# Patient Record
Sex: FEMALE | Race: WHITE | NOT HISPANIC OR LATINO | Employment: STUDENT | URBAN - METROPOLITAN AREA
[De-identification: names, ages, dates, MRNs, and addresses within clinical notes are randomized per-mention and may not be internally consistent; named-entity substitution may affect disease eponyms.]

---
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
## 2020-02-13 ENCOUNTER — OFFICE VISIT (OUTPATIENT)
Dept: FAMILY MEDICINE CLINIC | Facility: CLINIC | Age: 20
End: 2020-02-13
Payer: COMMERCIAL

## 2020-02-13 VITALS
RESPIRATION RATE: 14 BRPM | HEIGHT: 66 IN | HEART RATE: 80 BPM | SYSTOLIC BLOOD PRESSURE: 110 MMHG | DIASTOLIC BLOOD PRESSURE: 80 MMHG | WEIGHT: 163 LBS | BODY MASS INDEX: 26.2 KG/M2

## 2020-02-13 DIAGNOSIS — Z00.00 PE (PHYSICAL EXAM), ANNUAL: ICD-10-CM

## 2020-02-13 DIAGNOSIS — Z23 NEED FOR VACCINATION: Primary | ICD-10-CM

## 2020-02-13 PROCEDURE — 99395 PREV VISIT EST AGE 18-39: CPT | Performed by: NURSE PRACTITIONER

## 2020-02-13 PROCEDURE — 90686 IIV4 VACC NO PRSV 0.5 ML IM: CPT

## 2020-02-13 PROCEDURE — 90471 IMMUNIZATION ADMIN: CPT

## 2020-02-13 NOTE — PROGRESS NOTES
Bhanu Barrett is a 23 y o   female and is here for routine health maintenance  Needs pe for work  The patient reports no problems  Cancer Screening  Colononoscopy N/A  Mammogram N/A  Pap N/A  Abnormal pap? no  Smoker never      Immunization History   Administered Date(s) Administered    DTP 01/24/2001, 03/28/2001    DTaP 5 08/23/2001, 05/14/2002, 06/20/2006    Hep B, adult 2000, 02/28/2001, 12/06/2001    Hib (PRP-OMP) 01/24/2001, 03/28/2001, 05/24/2001, 12/06/2001    IPV 02/28/2001, 05/24/2001, 05/14/2002, 08/24/2017    Influenza Quadrivalent, 6-35 Months IM 10/14/2014, 11/01/2017    Influenza, injectable, quadrivalent, preservative free 0 5 mL 02/13/2020    MMR 05/14/2002, 06/20/2006    Meningococcal B, Recombinant (Shantell Oakley) 08/24/2017    Meningococcal MCV4P 08/24/2017    Meningococcal, Unknown Serogroups 01/22/2013, 08/24/2017    Pneumococcal Conjugate PCV 7 02/28/2001, 03/28/2001    Tdap 01/22/2013    Tuberculin Skin Test-PPD Intradermal 04/04/2016, 05/17/2016, 11/01/2017    Varicella 12/06/2001, 03/27/2009        History:  LMP: No LMP recorded  Dentist Visit  within 6-12 months      The following portions of the patient's history were reviewed and updated as appropriate: allergies, current medications, past family history, past medical history, past social history, past surgical history and problem list       Review of Systems  Review of Systems   Constitutional: Negative  HENT: Negative  Eyes: Negative  Respiratory: Negative  Cardiovascular: Negative  Gastrointestinal: Negative  Endocrine: Negative  Genitourinary: Negative  Musculoskeletal: Negative  Skin: Negative  Allergic/Immunologic: Negative  Neurological: Negative  Hematological: Negative  Psychiatric/Behavioral: Negative            Objective   Vitals:    02/13/20 1506   BP: 110/80   Pulse: 80   Resp: 14     Wt Readings from Last 3 Encounters:   02/13/20 73 9 kg (163 lb) (89 %, Z= 1 25)*   10/24/19 72 6 kg (160 lb) (88 %, Z= 1 20)*   05/16/19 71 7 kg (158 lb) (88 %, Z= 1 19)*     * Growth percentiles are based on CDC (Girls, 2-20 Years) data  BP Readings from Last 3 Encounters:   02/13/20 110/80   10/24/19 110/68   05/16/19 122/82     Pulse Readings from Last 3 Encounters:   02/13/20 80   10/24/19 72   05/16/19 70     BMI Readings from Last 3 Encounters:   02/13/20 26 31 kg/m² (85 %, Z= 1 06)*   10/24/19 26 63 kg/m² (87 %, Z= 1 13)*   05/16/19 26 29 kg/m² (87 %, Z= 1 11)*     * Growth percentiles are based on [facility] (Girls, 2-20 Years) data  Physical Exam   Constitutional: She is oriented to person, place, and time  She appears well-developed and well-nourished  HENT:   Head: Normocephalic  Eyes: Pupils are equal, round, and reactive to light  Neck: Normal range of motion  Neck supple  Cardiovascular: Normal rate and regular rhythm  Pulmonary/Chest: Effort normal and breath sounds normal    Abdominal: Soft  Bowel sounds are normal    Musculoskeletal: Normal range of motion  Neurological: She is alert and oriented to person, place, and time  Skin: Skin is warm  Psychiatric: She has a normal mood and affect  Her behavior is normal  Judgment and thought content normal        Assessment/Plan     Healthy female exam     1  PE: conducted for pt  Forms completed  2  Patient Counseling:  --Nutrition: Stressed importance of moderation in sodium/caffeine intake, saturated fat and cholesterol, caloric balance, sufficient intake of fresh fruits, vegetables, fiber, calcium, iron  --Exercise: Stressed the importance of regular exercise  --Injury prevention: Discussed safety belts, safety helmets, smoke detector, smoking near bedding or upholstery  --Dental health: Discussed importance of regular tooth brushing, flossing, and dental visits  --Immunizations reviewed  --Discussed benefits of screening colonoscopy  --After hours service discussed with patient    3  Cancer Screening N/A  4  Labs not indicated   5  IMM all UTD  6  Follow up as needed for acute illness

## 2020-02-21 ENCOUNTER — APPOINTMENT (OUTPATIENT)
Dept: LAB | Facility: CLINIC | Age: 20
End: 2020-02-21

## 2020-02-21 ENCOUNTER — OCCMED (OUTPATIENT)
Dept: URGENT CARE | Facility: CLINIC | Age: 20
End: 2020-02-21

## 2020-02-21 DIAGNOSIS — Z02.1 PRE-EMPLOYMENT HEALTH SCREENING EXAMINATION: Primary | ICD-10-CM

## 2020-02-21 DIAGNOSIS — Z02.1 PRE-EMPLOYMENT HEALTH SCREENING EXAMINATION: ICD-10-CM

## 2020-02-21 PROCEDURE — 36415 COLL VENOUS BLD VENIPUNCTURE: CPT

## 2020-02-21 PROCEDURE — 86480 TB TEST CELL IMMUN MEASURE: CPT

## 2020-02-24 LAB
GAMMA INTERFERON BACKGROUND BLD IA-ACNC: 0.02 IU/ML
M TB IFN-G BLD-IMP: NEGATIVE
M TB IFN-G CD4+ BCKGRND COR BLD-ACNC: 0.03 IU/ML
M TB IFN-G CD4+ BCKGRND COR BLD-ACNC: 0.04 IU/ML
MITOGEN IGNF BCKGRD COR BLD-ACNC: >10 IU/ML

## 2020-03-07 ENCOUNTER — OFFICE VISIT (OUTPATIENT)
Dept: URGENT CARE | Facility: CLINIC | Age: 20
End: 2020-03-07
Payer: COMMERCIAL

## 2020-03-07 VITALS
BODY MASS INDEX: 26.52 KG/M2 | OXYGEN SATURATION: 99 % | SYSTOLIC BLOOD PRESSURE: 106 MMHG | DIASTOLIC BLOOD PRESSURE: 64 MMHG | HEART RATE: 75 BPM | WEIGHT: 165 LBS | HEIGHT: 66 IN | RESPIRATION RATE: 18 BRPM | TEMPERATURE: 97.3 F

## 2020-03-07 DIAGNOSIS — N39.0 URINARY TRACT INFECTION WITHOUT HEMATURIA, SITE UNSPECIFIED: Primary | ICD-10-CM

## 2020-03-07 DIAGNOSIS — R30.0 DYSURIA: ICD-10-CM

## 2020-03-07 LAB
SL AMB  POCT GLUCOSE, UA: ABNORMAL
SL AMB LEUKOCYTE ESTERASE,UA: ABNORMAL
SL AMB POCT BILIRUBIN,UA: ABNORMAL
SL AMB POCT BLOOD,UA: ABNORMAL
SL AMB POCT CLARITY,UA: ABNORMAL
SL AMB POCT COLOR,UA: ABNORMAL
SL AMB POCT KETONES,UA: ABNORMAL
SL AMB POCT NITRITE,UA: ABNORMAL
SL AMB POCT PH,UA: 6
SL AMB POCT SPECIFIC GRAVITY,UA: 1.03
SL AMB POCT URINE PROTEIN: 100
SL AMB POCT UROBILINOGEN: 0.2

## 2020-03-07 PROCEDURE — G0382 LEV 3 HOSP TYPE B ED VISIT: HCPCS | Performed by: NURSE PRACTITIONER

## 2020-03-07 PROCEDURE — 81002 URINALYSIS NONAUTO W/O SCOPE: CPT | Performed by: NURSE PRACTITIONER

## 2020-03-07 PROCEDURE — 87086 URINE CULTURE/COLONY COUNT: CPT | Performed by: NURSE PRACTITIONER

## 2020-03-07 RX ORDER — PENICILLIN V POTASSIUM 500 MG/1
TABLET ORAL
COMMUNITY
Start: 2019-12-17 | End: 2021-09-02

## 2020-03-07 RX ORDER — HYDROCODONE BITARTRATE AND ACETAMINOPHEN 5; 325 MG/1; MG/1
TABLET ORAL
COMMUNITY
Start: 2019-12-17 | End: 2021-09-02

## 2020-03-07 RX ORDER — PHENAZOPYRIDINE HYDROCHLORIDE 100 MG/1
100 TABLET, FILM COATED ORAL 3 TIMES DAILY PRN
Qty: 10 TABLET | Refills: 0 | Status: SHIPPED | OUTPATIENT
Start: 2020-03-07 | End: 2021-09-02

## 2020-03-07 RX ORDER — CLINDAMYCIN PHOSPHATE 10 UG/ML
LOTION TOPICAL
COMMUNITY
Start: 2020-01-22 | End: 2021-09-02

## 2020-03-07 RX ORDER — CIPROFLOXACIN 250 MG/1
250 TABLET, FILM COATED ORAL EVERY 12 HOURS SCHEDULED
Qty: 10 TABLET | Refills: 0 | Status: SHIPPED | OUTPATIENT
Start: 2020-03-07 | End: 2020-03-12

## 2020-03-07 NOTE — PROGRESS NOTES
Assessment/Plan    Urinary tract infection without hematuria, site unspecified [N39 0]  1  Urinary tract infection without hematuria, site unspecified  ciprofloxacin (CIPRO) 250 mg tablet   2  Dysuria  POCT urine dip    Urine culture    phenazopyridine (PYRIDIUM) 100 mg tablet         Subjective:     Patient ID: Ab Briceño is a 23 y o  female  Reason For Visit / Chief Complaint  Chief Complaint   Patient presents with    Possible UTI     pain,cloudy urine,freq  need to urinate         This is a 77-year-old female patient who presents to the urgent care today  Patient is complaining of burning with urination, frequency of urination and cloudy urine for approximately 24 hours  Patient denies fever or chills  Patient denies abdominal pain, back pain or suprapubic pain  Patient denies hematuria  Patient does express concerned because last year she had urinary tract infection and she ends up being septic in the hospital   Patient is not sexually active          Past Medical History:   Diagnosis Date    Asthma     Left buttock abscess 10/06/2017    Neoplasm of uncertain behavior 03/90/4705    of skin       Past Surgical History:   Procedure Laterality Date    ABSCESS DRAINAGE Left 10/06/2017    I & D OF LEFT BUTTOCK ABSCESS IN OFFICE-ANDREA HARRELL PA-C    SD KNEE SCOPE,MED/LAT MENISECTOMY Right 2/1/2019    Procedure: ARTHROSCOPY KNEE, RIGHT RESECTION OF ANTERIOMEDIAL PLICA;  Surgeon: Hodan Capps MD;  Location:  MAIN OR;  Service: Orthopedics       Family History   Problem Relation Age of Onset    Heart disease Father     Hyperlipidemia Father     BRUNO disease Father     Asthma Mother     Hyperlipidemia Mother     BRUNO disease Mother     Diabetes Maternal Grandmother     Heart disease Maternal Grandmother     Hyperlipidemia Maternal Grandmother     Heart disease Maternal Grandfather     Hyperlipidemia Maternal Grandfather     Heart disease Paternal Grandmother     Drug abuse Neg Hx  Mental illness Neg Hx        Review of Systems   Constitutional: Negative for chills and fever  Respiratory: Negative for stridor  Cardiovascular: Negative for chest pain  Gastrointestinal: Negative for abdominal pain, diarrhea, nausea and vomiting  Genitourinary: Positive for dysuria, frequency and urgency  Negative for difficulty urinating, flank pain, hematuria and pelvic pain  Musculoskeletal: Negative for back pain  Neurological: Negative for headaches  Objective:    /64   Pulse 75   Temp (!) 97 3 °F (36 3 °C)   Resp 18   Ht 5' 6" (1 676 m)   Wt 74 8 kg (165 lb)   SpO2 99%   BMI 26 63 kg/m²     Physical Exam   Constitutional: She is oriented to person, place, and time  Vital signs are normal  She appears well-developed and well-nourished  She does not appear ill  HENT:   Head: Normocephalic and atraumatic  Neck: Normal range of motion  Cardiovascular: Normal rate, regular rhythm, S1 normal, S2 normal and normal heart sounds  Exam reveals no gallop and no friction rub  No murmur heard  Pulmonary/Chest: Effort normal and breath sounds normal  No stridor  No respiratory distress  She has no decreased breath sounds  She has no wheezes  She has no rhonchi  She has no rales  She exhibits no tenderness  Abdominal: Soft  Bowel sounds are normal  She exhibits no distension  There is no tenderness  There is no rigidity, no rebound, no guarding and no CVA tenderness  Musculoskeletal: Normal range of motion  Neurological: She is alert and oriented to person, place, and time  Skin: Skin is warm and dry  Capillary refill takes less than 2 seconds  Psychiatric: She has a normal mood and affect  Nursing note and vitals reviewed

## 2020-03-07 NOTE — PATIENT INSTRUCTIONS
We saw you today for a likely urinary tract infection  We will send her urine for culture  The culture takes 48 hours  In the meantime, take the antibiotic as prescribed  When the culture returns, if I need to change the antibiotic I will call you personally  Rest   Drink plenty of fluids  You may take the Pyridium every 8 hours for the next 2 days as needed for burning and frequency  Please be advised that it will turn your urine bright orange  You should not wear contacts while taking this medication

## 2020-03-08 LAB — BACTERIA UR CULT: NORMAL

## 2020-04-23 DIAGNOSIS — L70.0 ACNE VULGARIS: ICD-10-CM

## 2020-04-23 RX ORDER — NORGESTIMATE AND ETHINYL ESTRADIOL
KIT
Qty: 84 TABLET | Refills: 0 | Status: SHIPPED | OUTPATIENT
Start: 2020-04-23 | End: 2020-06-23

## 2020-05-20 ENCOUNTER — OFFICE VISIT (OUTPATIENT)
Dept: OBGYN CLINIC | Facility: CLINIC | Age: 20
End: 2020-05-20
Payer: COMMERCIAL

## 2020-05-20 VITALS
BODY MASS INDEX: 26.03 KG/M2 | SYSTOLIC BLOOD PRESSURE: 112 MMHG | HEIGHT: 66 IN | TEMPERATURE: 98.2 F | WEIGHT: 162 LBS | DIASTOLIC BLOOD PRESSURE: 72 MMHG

## 2020-05-20 DIAGNOSIS — M76.31 ILIOTIBIAL BAND SYNDROME, RIGHT LEG: Primary | ICD-10-CM

## 2020-05-20 PROCEDURE — 3008F BODY MASS INDEX DOCD: CPT | Performed by: ORTHOPAEDIC SURGERY

## 2020-05-20 PROCEDURE — 1036F TOBACCO NON-USER: CPT | Performed by: ORTHOPAEDIC SURGERY

## 2020-05-20 PROCEDURE — 99213 OFFICE O/P EST LOW 20 MIN: CPT | Performed by: ORTHOPAEDIC SURGERY

## 2020-06-21 DIAGNOSIS — L70.0 ACNE VULGARIS: ICD-10-CM

## 2020-06-23 RX ORDER — NORGESTIMATE AND ETHINYL ESTRADIOL
KIT
Qty: 84 TABLET | Refills: 0 | Status: SHIPPED | OUTPATIENT
Start: 2020-06-23 | End: 2020-10-28 | Stop reason: SDUPTHER

## 2020-09-03 ENCOUNTER — TRANSCRIBE ORDERS (OUTPATIENT)
Dept: ADMINISTRATIVE | Facility: HOSPITAL | Age: 20
End: 2020-09-03

## 2020-09-03 ENCOUNTER — APPOINTMENT (OUTPATIENT)
Dept: LAB | Facility: HOSPITAL | Age: 20
End: 2020-09-03
Payer: COMMERCIAL

## 2020-09-03 DIAGNOSIS — L57.8 NODULAR ELASTOSIS WITH CYSTS AND COMEDONES OF FAVRE AND RACOUCHOT: ICD-10-CM

## 2020-09-03 DIAGNOSIS — L70.0 NODULAR ELASTOSIS WITH CYSTS AND COMEDONES OF FAVRE AND RACOUCHOT: ICD-10-CM

## 2020-09-03 DIAGNOSIS — Z79.899 ENCOUNTER FOR LONG-TERM (CURRENT) USE OF OTHER MEDICATIONS: ICD-10-CM

## 2020-09-03 DIAGNOSIS — Z79.899 ENCOUNTER FOR LONG-TERM (CURRENT) USE OF OTHER MEDICATIONS: Primary | ICD-10-CM

## 2020-09-03 LAB
ANION GAP SERPL CALCULATED.3IONS-SCNC: 10 MMOL/L (ref 4–13)
B-HCG SERPL-ACNC: <2 MIU/ML
BUN SERPL-MCNC: 11 MG/DL (ref 5–25)
CALCIUM SERPL-MCNC: 9.1 MG/DL (ref 8.3–10.1)
CHLORIDE SERPL-SCNC: 107 MMOL/L (ref 100–108)
CO2 SERPL-SCNC: 24 MMOL/L (ref 21–32)
CREAT SERPL-MCNC: 0.78 MG/DL (ref 0.6–1.3)
GFR SERPL CREATININE-BSD FRML MDRD: 111 ML/MIN/1.73SQ M
GLUCOSE SERPL-MCNC: 92 MG/DL (ref 65–140)
POTASSIUM SERPL-SCNC: 3.9 MMOL/L (ref 3.5–5.3)
SODIUM SERPL-SCNC: 141 MMOL/L (ref 136–145)

## 2020-09-03 PROCEDURE — 84702 CHORIONIC GONADOTROPIN TEST: CPT

## 2020-09-03 PROCEDURE — 36415 COLL VENOUS BLD VENIPUNCTURE: CPT

## 2020-09-03 PROCEDURE — 80048 BASIC METABOLIC PNL TOTAL CA: CPT

## 2020-10-26 ENCOUNTER — APPOINTMENT (EMERGENCY)
Dept: RADIOLOGY | Facility: HOSPITAL | Age: 20
End: 2020-10-26
Payer: COMMERCIAL

## 2020-10-26 ENCOUNTER — HOSPITAL ENCOUNTER (OUTPATIENT)
Facility: HOSPITAL | Age: 20
Setting detail: OBSERVATION
Discharge: HOME | End: 2020-10-27
Attending: SURGERY | Admitting: SURGERY
Payer: COMMERCIAL

## 2020-10-26 DIAGNOSIS — R18.8 FREE FLUID IN PELVIS: ICD-10-CM

## 2020-10-26 DIAGNOSIS — S01.81XA LACERATION OF FOREHEAD, INITIAL ENCOUNTER: Primary | ICD-10-CM
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
## 2020-10-26 LAB
BASE EXCESS BLDA CALC-SCNC: -3 MMOL/L (ref -2–3)
CA-I BLD-SCNC: 1.09 MMOL/L (ref 1.12–1.32)
GLUCOSE SERPL-MCNC: 98 MG/DL (ref 65–140)
HCO3 BLDA-SCNC: 20.3 MMOL/L (ref 24–30)
HCT VFR BLD AUTO: 37.4 % (ref 34.8–46.1)
HCT VFR BLD CALC: 34 % (ref 34.8–46.1)
HGB BLD-MCNC: 12.1 G/DL (ref 11.5–15.4)
HGB BLDA-MCNC: 11.6 G/DL (ref 11.5–15.4)
PCO2 BLD: 21 MMOL/L (ref 21–32)
PCO2 BLD: 29.8 MM HG (ref 42–50)
PH BLD: 7.44 [PH] (ref 7.3–7.4)
PO2 BLD: 27 MM HG (ref 35–45)
POTASSIUM BLD-SCNC: 3.4 MMOL/L (ref 3.5–5.3)
SAO2 % BLD FROM PO2: 55 % (ref 60–85)
SODIUM BLD-SCNC: 142 MMOL/L (ref 136–145)
SPECIMEN SOURCE: ABNORMAL

## 2020-10-26 PROCEDURE — 90471 IMMUNIZATION ADMIN: CPT

## 2020-10-26 PROCEDURE — 85014 HEMATOCRIT: CPT | Performed by: PHYSICIAN ASSISTANT

## 2020-10-26 PROCEDURE — 85014 HEMATOCRIT: CPT

## 2020-10-26 PROCEDURE — 12013 RPR F/E/E/N/L/M 2.6-5.0 CM: CPT | Performed by: SURGERY

## 2020-10-26 PROCEDURE — 82947 ASSAY GLUCOSE BLOOD QUANT: CPT

## 2020-10-26 PROCEDURE — 99219 PR INITIAL OBSERVATION CARE/DAY 50 MINUTES: CPT | Performed by: SURGERY

## 2020-10-26 PROCEDURE — 84132 ASSAY OF SERUM POTASSIUM: CPT

## 2020-10-26 PROCEDURE — 96374 THER/PROPH/DIAG INJ IV PUSH: CPT

## 2020-10-26 PROCEDURE — 70450 CT HEAD/BRAIN W/O DYE: CPT

## 2020-10-26 PROCEDURE — 90715 TDAP VACCINE 7 YRS/> IM: CPT | Performed by: SURGERY

## 2020-10-26 PROCEDURE — 74177 CT ABD & PELVIS W/CONTRAST: CPT

## 2020-10-26 PROCEDURE — 36415 COLL VENOUS BLD VENIPUNCTURE: CPT | Performed by: PHYSICIAN ASSISTANT

## 2020-10-26 PROCEDURE — 71260 CT THORAX DX C+: CPT

## 2020-10-26 PROCEDURE — 82803 BLOOD GASES ANY COMBINATION: CPT

## 2020-10-26 PROCEDURE — 99285 EMERGENCY DEPT VISIT HI MDM: CPT

## 2020-10-26 PROCEDURE — 96376 TX/PRO/DX INJ SAME DRUG ADON: CPT

## 2020-10-26 PROCEDURE — 85018 HEMOGLOBIN: CPT | Performed by: PHYSICIAN ASSISTANT

## 2020-10-26 PROCEDURE — 72125 CT NECK SPINE W/O DYE: CPT

## 2020-10-26 PROCEDURE — 82330 ASSAY OF CALCIUM: CPT

## 2020-10-26 PROCEDURE — 84295 ASSAY OF SERUM SODIUM: CPT

## 2020-10-26 PROCEDURE — 93308 TTE F-UP OR LMTD: CPT | Performed by: SURGERY

## 2020-10-26 PROCEDURE — 76705 ECHO EXAM OF ABDOMEN: CPT | Performed by: SURGERY

## 2020-10-26 PROCEDURE — NC001 PR NO CHARGE: Performed by: PHYSICIAN ASSISTANT

## 2020-10-26 PROCEDURE — 73560 X-RAY EXAM OF KNEE 1 OR 2: CPT

## 2020-10-26 RX ORDER — OXYCODONE HYDROCHLORIDE 5 MG/1
5 TABLET ORAL EVERY 4 HOURS PRN
Status: DISCONTINUED | OUTPATIENT
Start: 2020-10-26 | End: 2020-10-27 | Stop reason: HOSPADM

## 2020-10-26 RX ORDER — HYDROMORPHONE HCL/PF 1 MG/ML
0.2 SYRINGE (ML) INJECTION
Status: DISCONTINUED | OUTPATIENT
Start: 2020-10-26 | End: 2020-10-27 | Stop reason: HOSPADM

## 2020-10-26 RX ORDER — OXYCODONE HYDROCHLORIDE 10 MG/1
10 TABLET ORAL EVERY 4 HOURS PRN
Status: DISCONTINUED | OUTPATIENT
Start: 2020-10-26 | End: 2020-10-27 | Stop reason: HOSPADM

## 2020-10-26 RX ORDER — SODIUM CHLORIDE 9 MG/ML
125 INJECTION, SOLUTION INTRAVENOUS CONTINUOUS
Status: DISCONTINUED | OUTPATIENT
Start: 2020-10-26 | End: 2020-10-27

## 2020-10-26 RX ORDER — FENTANYL CITRATE 50 UG/ML
INJECTION, SOLUTION INTRAMUSCULAR; INTRAVENOUS CODE/TRAUMA/SEDATION MEDICATION
Status: COMPLETED | OUTPATIENT
Start: 2020-10-26 | End: 2020-10-26

## 2020-10-26 RX ORDER — FENTANYL CITRATE 50 UG/ML
25 INJECTION, SOLUTION INTRAMUSCULAR; INTRAVENOUS ONCE
Status: COMPLETED | OUTPATIENT
Start: 2020-10-26 | End: 2020-10-26

## 2020-10-26 RX ORDER — LIDOCAINE HYDROCHLORIDE 10 MG/ML
30 INJECTION, SOLUTION EPIDURAL; INFILTRATION; INTRACAUDAL; PERINEURAL ONCE
Status: COMPLETED | OUTPATIENT
Start: 2020-10-26 | End: 2020-10-26

## 2020-10-26 RX ORDER — ACETAMINOPHEN 325 MG/1
975 TABLET ORAL EVERY 8 HOURS SCHEDULED
Status: DISCONTINUED | OUTPATIENT
Start: 2020-10-26 | End: 2020-10-27 | Stop reason: HOSPADM

## 2020-10-26 RX ADMIN — FENTANYL CITRATE 25 MCG: 50 INJECTION, SOLUTION INTRAMUSCULAR; INTRAVENOUS at 16:36

## 2020-10-26 RX ADMIN — FENTANYL CITRATE 25 MCG: 50 INJECTION INTRAMUSCULAR; INTRAVENOUS at 17:08

## 2020-10-26 RX ADMIN — IOHEXOL 100 ML: 350 INJECTION, SOLUTION INTRAVENOUS at 16:51

## 2020-10-26 RX ADMIN — OXYCODONE HYDROCHLORIDE 5 MG: 5 TABLET ORAL at 18:51

## 2020-10-26 RX ADMIN — ACETAMINOPHEN 975 MG: 325 TABLET, FILM COATED ORAL at 22:13

## 2020-10-26 RX ADMIN — SODIUM CHLORIDE 125 ML/HR: 0.9 INJECTION, SOLUTION INTRAVENOUS at 18:52

## 2020-10-26 RX ADMIN — LIDOCAINE HYDROCHLORIDE 30 ML: 10 INJECTION, SOLUTION EPIDURAL; INFILTRATION; INTRACAUDAL; PERINEURAL at 17:00

## 2020-10-26 RX ADMIN — TETANUS TOXOID, REDUCED DIPHTHERIA TOXOID AND ACELLULAR PERTUSSIS VACCINE, ADSORBED 0.5 ML: 5; 2.5; 8; 8; 2.5 SUSPENSION INTRAMUSCULAR at 16:44

## 2020-10-27 VITALS
HEIGHT: 66 IN | HEART RATE: 75 BPM | RESPIRATION RATE: 18 BRPM | BODY MASS INDEX: 27.85 KG/M2 | WEIGHT: 173.28 LBS | SYSTOLIC BLOOD PRESSURE: 122 MMHG | TEMPERATURE: 97.2 F | OXYGEN SATURATION: 90 % | DIASTOLIC BLOOD PRESSURE: 65 MMHG

## 2020-10-27 PROBLEM — R18.8 FREE FLUID IN PELVIS: Status: ACTIVE | Noted: 2020-10-27

## 2020-10-27 LAB
ANION GAP SERPL CALCULATED.3IONS-SCNC: 3 MMOL/L (ref 4–13)
BASOPHILS # BLD AUTO: 0.04 THOUSANDS/ΜL (ref 0–0.1)
BASOPHILS NFR BLD AUTO: 0 % (ref 0–1)
BUN SERPL-MCNC: 7 MG/DL (ref 5–25)
CALCIUM SERPL-MCNC: 8.7 MG/DL (ref 8.3–10.1)
CHLORIDE SERPL-SCNC: 113 MMOL/L (ref 100–108)
CO2 SERPL-SCNC: 26 MMOL/L (ref 21–32)
CREAT SERPL-MCNC: 0.6 MG/DL (ref 0.6–1.3)
EOSINOPHIL # BLD AUTO: 0.05 THOUSAND/ΜL (ref 0–0.61)
EOSINOPHIL NFR BLD AUTO: 0 % (ref 0–6)
ERYTHROCYTE [DISTWIDTH] IN BLOOD BY AUTOMATED COUNT: 12.1 % (ref 11.6–15.1)
GFR SERPL CREATININE-BSD FRML MDRD: 133 ML/MIN/1.73SQ M
GLUCOSE SERPL-MCNC: 81 MG/DL (ref 65–140)
HCT VFR BLD AUTO: 35 % (ref 34.8–46.1)
HCT VFR BLD AUTO: 39 % (ref 34.8–46.1)
HGB BLD-MCNC: 11 G/DL (ref 11.5–15.4)
HGB BLD-MCNC: 12.3 G/DL (ref 11.5–15.4)
IMM GRANULOCYTES # BLD AUTO: 0.03 THOUSAND/UL (ref 0–0.2)
IMM GRANULOCYTES NFR BLD AUTO: 0 % (ref 0–2)
LYMPHOCYTES # BLD AUTO: 2.17 THOUSANDS/ΜL (ref 0.6–4.47)
LYMPHOCYTES NFR BLD AUTO: 17 % (ref 14–44)
MCH RBC QN AUTO: 30 PG (ref 26.8–34.3)
MCHC RBC AUTO-ENTMCNC: 31.4 G/DL (ref 31.4–37.4)
MCV RBC AUTO: 95 FL (ref 82–98)
MONOCYTES # BLD AUTO: 1.29 THOUSAND/ΜL (ref 0.17–1.22)
MONOCYTES NFR BLD AUTO: 10 % (ref 4–12)
NEUTROPHILS # BLD AUTO: 8.93 THOUSANDS/ΜL (ref 1.85–7.62)
NEUTS SEG NFR BLD AUTO: 73 % (ref 43–75)
NRBC BLD AUTO-RTO: 0 /100 WBCS
PLATELET # BLD AUTO: 245 THOUSANDS/UL (ref 149–390)
PMV BLD AUTO: 10.5 FL (ref 8.9–12.7)
POTASSIUM SERPL-SCNC: 3.6 MMOL/L (ref 3.5–5.3)
RBC # BLD AUTO: 3.67 MILLION/UL (ref 3.81–5.12)
SODIUM SERPL-SCNC: 142 MMOL/L (ref 136–145)
WBC # BLD AUTO: 12.51 THOUSAND/UL (ref 4.31–10.16)

## 2020-10-27 PROCEDURE — 85025 COMPLETE CBC W/AUTO DIFF WBC: CPT | Performed by: PHYSICIAN ASSISTANT

## 2020-10-27 PROCEDURE — 85014 HEMATOCRIT: CPT | Performed by: PHYSICIAN ASSISTANT

## 2020-10-27 PROCEDURE — 97163 PT EVAL HIGH COMPLEX 45 MIN: CPT

## 2020-10-27 PROCEDURE — 36415 COLL VENOUS BLD VENIPUNCTURE: CPT | Performed by: PHYSICIAN ASSISTANT

## 2020-10-27 PROCEDURE — 97166 OT EVAL MOD COMPLEX 45 MIN: CPT

## 2020-10-27 PROCEDURE — NC001 PR NO CHARGE: Performed by: SURGERY

## 2020-10-27 PROCEDURE — 80048 BASIC METABOLIC PNL TOTAL CA: CPT | Performed by: PHYSICIAN ASSISTANT

## 2020-10-27 PROCEDURE — 85018 HEMOGLOBIN: CPT | Performed by: PHYSICIAN ASSISTANT

## 2020-10-27 PROCEDURE — 99217 PR OBSERVATION CARE DISCHARGE MANAGEMENT: CPT | Performed by: SURGERY

## 2020-10-27 RX ORDER — OXYCODONE HYDROCHLORIDE 5 MG/1
TABLET ORAL
Qty: 20 TABLET | Refills: 0 | Status: SHIPPED | OUTPATIENT
Start: 2020-10-27 | End: 2021-09-02

## 2020-10-27 RX ORDER — LANOLIN ALCOHOL/MO/W.PET/CERES
6 CREAM (GRAM) TOPICAL
Status: DISCONTINUED | OUTPATIENT
Start: 2020-10-27 | End: 2020-10-27

## 2020-10-27 RX ORDER — ONDANSETRON 4 MG/1
4 TABLET, ORALLY DISINTEGRATING ORAL EVERY 6 HOURS PRN
Status: DISCONTINUED | OUTPATIENT
Start: 2020-10-27 | End: 2020-10-27 | Stop reason: HOSPADM

## 2020-10-27 RX ORDER — LANOLIN ALCOHOL/MO/W.PET/CERES
6 CREAM (GRAM) TOPICAL
Status: DISCONTINUED | OUTPATIENT
Start: 2020-10-27 | End: 2020-10-27 | Stop reason: HOSPADM

## 2020-10-27 RX ADMIN — ACETAMINOPHEN 975 MG: 325 TABLET, FILM COATED ORAL at 13:07

## 2020-10-27 RX ADMIN — MELATONIN 6 MG: at 03:00

## 2020-10-27 RX ADMIN — OXYCODONE HYDROCHLORIDE 10 MG: 10 TABLET ORAL at 06:03

## 2020-10-27 RX ADMIN — ACETAMINOPHEN 975 MG: 325 TABLET, FILM COATED ORAL at 05:24

## 2020-10-27 RX ADMIN — OXYCODONE HYDROCHLORIDE 5 MG: 5 TABLET ORAL at 11:40

## 2020-10-27 RX ADMIN — SODIUM CHLORIDE 125 ML/HR: 0.9 INJECTION, SOLUTION INTRAVENOUS at 05:28

## 2020-10-27 RX ADMIN — ONDANSETRON 4 MG: 4 TABLET, ORALLY DISINTEGRATING ORAL at 11:40

## 2020-10-27 RX ADMIN — ENOXAPARIN SODIUM 30 MG: 30 INJECTION SUBCUTANEOUS at 09:50

## 2020-10-28 ENCOUNTER — TRANSITIONAL CARE MANAGEMENT (OUTPATIENT)
Dept: FAMILY MEDICINE CLINIC | Facility: CLINIC | Age: 20
End: 2020-10-28

## 2020-10-28 DIAGNOSIS — L70.0 ACNE VULGARIS: ICD-10-CM

## 2020-10-28 RX ORDER — NORGESTIMATE AND ETHINYL ESTRADIOL 7DAYSX3 LO
1 KIT ORAL DAILY
Qty: 84 TABLET | Refills: 0 | Status: SHIPPED | OUTPATIENT
Start: 2020-10-28 | End: 2020-12-03

## 2020-10-30 ENCOUNTER — TELEPHONE (OUTPATIENT)
Dept: SURGERY | Facility: CLINIC | Age: 20
End: 2020-10-30
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
## 2020-10-30 RX ORDER — METHOCARBAMOL 500 MG/1
500 TABLET, FILM COATED ORAL
Qty: 15 TABLET | Refills: 0 | Status: SHIPPED | OUTPATIENT
Start: 2020-10-30 | End: 2021-09-02

## 2020-11-01 ENCOUNTER — OFFICE VISIT (OUTPATIENT)
Dept: URGENT CARE | Facility: CLINIC | Age: 20
End: 2020-11-01
Payer: COMMERCIAL

## 2020-11-01 VITALS
DIASTOLIC BLOOD PRESSURE: 69 MMHG | HEIGHT: 65 IN | HEART RATE: 75 BPM | SYSTOLIC BLOOD PRESSURE: 131 MMHG | TEMPERATURE: 96.9 F | WEIGHT: 164 LBS | RESPIRATION RATE: 16 BRPM | BODY MASS INDEX: 27.32 KG/M2 | OXYGEN SATURATION: 100 %

## 2020-11-01 DIAGNOSIS — Z48.02 ENCOUNTER FOR REMOVAL OF SUTURES: Primary | ICD-10-CM

## 2020-11-01 PROCEDURE — G0382 LEV 3 HOSP TYPE B ED VISIT: HCPCS | Performed by: PHYSICIAN ASSISTANT

## 2020-11-01 RX ORDER — SPIRONOLACTONE 25 MG/1
25 TABLET ORAL DAILY
COMMUNITY
End: 2021-09-02

## 2020-11-10 ENCOUNTER — OFFICE VISIT (OUTPATIENT)
Dept: SURGERY | Facility: CLINIC | Age: 20
End: 2020-11-10
Payer: COMMERCIAL

## 2020-11-10 VITALS — TEMPERATURE: 98 F | BODY MASS INDEX: 26.79 KG/M2 | HEIGHT: 65 IN | WEIGHT: 160.8 LBS

## 2020-11-10 DIAGNOSIS — R18.8 FREE FLUID IN PELVIS: Primary | ICD-10-CM

## 2020-11-10 PROCEDURE — 99213 OFFICE O/P EST LOW 20 MIN: CPT | Performed by: EMERGENCY MEDICINE

## 2020-11-30 ENCOUNTER — TRANSCRIBE ORDERS (OUTPATIENT)
Dept: ADMINISTRATIVE | Facility: HOSPITAL | Age: 20
End: 2020-11-30

## 2020-11-30 ENCOUNTER — APPOINTMENT (OUTPATIENT)
Dept: LAB | Facility: HOSPITAL | Age: 20
End: 2020-11-30
Payer: COMMERCIAL

## 2020-11-30 DIAGNOSIS — Z79.899 ENCOUNTER FOR LONG-TERM (CURRENT) USE OF OTHER MEDICATIONS: Primary | ICD-10-CM

## 2020-11-30 DIAGNOSIS — Z79.899 ENCOUNTER FOR LONG-TERM (CURRENT) USE OF OTHER MEDICATIONS: ICD-10-CM

## 2020-11-30 LAB — POTASSIUM SERPL-SCNC: 3.4 MMOL/L (ref 3.5–5.3)

## 2020-11-30 PROCEDURE — 84132 ASSAY OF SERUM POTASSIUM: CPT

## 2020-11-30 PROCEDURE — 36415 COLL VENOUS BLD VENIPUNCTURE: CPT

## 2020-12-02 DIAGNOSIS — L70.0 ACNE VULGARIS: ICD-10-CM

## 2020-12-03 RX ORDER — NORGESTIMATE AND ETHINYL ESTRADIOL
KIT
Qty: 84 TABLET | Refills: 0 | Status: SHIPPED | OUTPATIENT
Start: 2020-12-03 | End: 2021-03-08

## 2020-12-24 ENCOUNTER — TELEPHONE (OUTPATIENT)
Dept: FAMILY MEDICINE CLINIC | Facility: CLINIC | Age: 20
End: 2020-12-24

## 2020-12-24 DIAGNOSIS — Z91.89 AT INCREASED RISK OF EXPOSURE TO COVID-19 VIRUS: Primary | ICD-10-CM

## 2020-12-31 ENCOUNTER — LAB (OUTPATIENT)
Dept: LAB | Facility: HOSPITAL | Age: 20
End: 2020-12-31
Payer: COMMERCIAL

## 2020-12-31 DIAGNOSIS — Z91.89 AT INCREASED RISK OF EXPOSURE TO COVID-19 VIRUS: ICD-10-CM

## 2020-12-31 PROCEDURE — 86769 SARS-COV-2 COVID-19 ANTIBODY: CPT

## 2020-12-31 PROCEDURE — 36415 COLL VENOUS BLD VENIPUNCTURE: CPT

## 2021-01-01 LAB — SARS-COV-2 IGM SERPL QL IA: NEGATIVE

## 2021-01-01 NOTE — RESULT ENCOUNTER NOTE
Please call patient  No antibodies to COVID so she cannot donate plasma  She apparently she never got the disease

## 2021-01-04 ENCOUNTER — TELEPHONE (OUTPATIENT)
Dept: FAMILY MEDICINE CLINIC | Facility: CLINIC | Age: 21
End: 2021-01-04

## 2021-01-04 NOTE — TELEPHONE ENCOUNTER
----- Message from Leo Elias DO sent at 1/1/2021  6:42 AM EST -----  Please call patient  No antibodies to COVID so she cannot donate plasma  She apparently she never got the disease      Left detailed message to patient

## 2021-03-07 DIAGNOSIS — L70.0 ACNE VULGARIS: ICD-10-CM

## 2021-03-08 RX ORDER — NORGESTIMATE AND ETHINYL ESTRADIOL
KIT
Qty: 84 TABLET | Refills: 0 | Status: SHIPPED | OUTPATIENT
Start: 2021-03-08 | End: 2021-06-03 | Stop reason: SDUPTHER

## 2021-06-03 DIAGNOSIS — L70.0 ACNE VULGARIS: ICD-10-CM

## 2021-06-03 RX ORDER — NORGESTIMATE AND ETHINYL ESTRADIOL 7DAYSX3 LO
1 KIT ORAL DAILY
Qty: 84 TABLET | Refills: 0 | Status: SHIPPED | OUTPATIENT
Start: 2021-06-03 | End: 2021-08-06 | Stop reason: SDUPTHER

## 2021-06-28 ENCOUNTER — OFFICE VISIT (OUTPATIENT)
Dept: URGENT CARE | Facility: CLINIC | Age: 21
End: 2021-06-28
Payer: COMMERCIAL

## 2021-06-28 VITALS
WEIGHT: 160 LBS | HEART RATE: 82 BPM | HEIGHT: 66 IN | SYSTOLIC BLOOD PRESSURE: 118 MMHG | TEMPERATURE: 98.2 F | BODY MASS INDEX: 25.71 KG/M2 | RESPIRATION RATE: 16 BRPM | OXYGEN SATURATION: 97 % | DIASTOLIC BLOOD PRESSURE: 70 MMHG

## 2021-06-28 DIAGNOSIS — J45.21 MILD INTERMITTENT ASTHMA WITH ACUTE EXACERBATION: Primary | ICD-10-CM

## 2021-06-28 PROCEDURE — G0382 LEV 3 HOSP TYPE B ED VISIT: HCPCS | Performed by: PHYSICIAN ASSISTANT

## 2021-06-28 RX ORDER — PREDNISONE 10 MG/1
TABLET ORAL
Qty: 21 TABLET | Refills: 0 | Status: SHIPPED | OUTPATIENT
Start: 2021-06-28 | End: 2021-09-02

## 2021-06-28 NOTE — PATIENT INSTRUCTIONS
Asthma   WHAT YOU NEED TO KNOW:   Asthma is a lung disease that makes breathing difficult  Chronic inflammation and reactions to triggers narrow the airways in the lungs  Asthma can become life-threatening if it is not managed  DISCHARGE INSTRUCTIONS:   Call your local emergency number (911 in the [address]) if:   · You have severe shortness of breath  · Your lips or nails turn blue or gray  · The skin around your neck and ribs pulls in with each breath  · You have shortness of breath, even after you take your short-term medicine as directed  · Your peak flow numbers are in the red zone of your AAP  Call your doctor if:   · You run out of medicine before your next refill is due  · Your symptoms get worse  · You need to take more medicine than usual to control your symptoms  · You have questions or concerns about your condition or care  Medicines:   · Medicines  decrease inflammation, open airways, and make it easier to breathe  Medicines may be inhaled, taken as a pill, or injected  Short-term medicines relieve your symptoms quickly  Long-term medicines are used to prevent future attacks  You may also need medicine to help control your allergies  Ask your healthcare provider for more information about the medicine you are given and how to take it safely  · Take your medicine as directed  Contact your healthcare provider if you think your medicine is not helping or if you have side effects  Tell him of her if you are allergic to any medicine  Keep a list of the medicines, vitamins, and herbs you take  Include the amounts, and when and why you take them  Bring the list or the pill bottles to follow-up visits  Carry your medicine list with you in case of an emergency  Follow up with your healthcare provider as directed: You will need to return to make sure your medicine is working and your symptoms are controlled   You may be referred to an asthma specialist  Marcey Canavan may be asked to keep a record of your peak flow values and bring it with you to your appointments  Write down your questions so you remember to ask them during your visits  Manage your symptoms and prevent future attacks:   · Follow your Asthma Action Plan (AAP)  This is a written plan that you and your healthcare provider create  It explains which medicine you need and when to change doses if necessary  It also explains how you can monitor symptoms and use a peak flow meter  The meter measures how well your lungs are working  · Manage other health conditions , such as allergies, acid reflux, and sleep apnea  · Identify and avoid triggers  These may include pets, dust mites, mold, and cockroaches  · Do not smoke or be around others who smoke  Nicotine and other chemicals in cigarettes and cigars can cause lung damage  Ask your healthcare provider for information if you currently smoke and need help to quit  E-cigarettes or smokeless tobacco still contain nicotine  Talk to your healthcare provider before you use these products  · Ask about the flu vaccine  The flu can make your asthma worse  You may need a yearly flu shot  © Copyright [address] Information is for End User's use only and may not be sold, redistributed or otherwise used for commercial purposes  All illustrations and images included in CareNotes® are the copyrighted property of A D A M , Inc  or [address] [place] [address] [place]   The above information is an  only  It is not intended as medical advice for individual conditions or treatments  Talk to your doctor, nurse or pharmacist before following any medical regimen to see if it is safe and effective for you

## 2021-08-05 ENCOUNTER — TELEPHONE (OUTPATIENT)
Dept: FAMILY MEDICINE CLINIC | Facility: CLINIC | Age: 21
End: 2021-08-05

## 2021-08-05 NOTE — TELEPHONE ENCOUNTER
She said she has an asthma cough only went to care now [facility]   [phone]

## 2021-08-05 NOTE — TELEPHONE ENCOUNTER
When she calls back just confirm that it is asthma symptoms, not a fever, productive cough, achy, chills, ect  Thanks

## 2021-08-05 NOTE — TELEPHONE ENCOUNTER
Patient put herself in the schedule on 8/23 for cough and asthma follow up  Dejah Bravo for her to come in?  I have a call out to her to see is she is vaccinated

## 2021-08-23 ENCOUNTER — TELEPHONE (OUTPATIENT)
Dept: FAMILY MEDICINE CLINIC | Facility: CLINIC | Age: 21
End: 2021-08-23

## 2021-09-02 ENCOUNTER — OFFICE VISIT (OUTPATIENT)
Dept: FAMILY MEDICINE CLINIC | Facility: CLINIC | Age: 21
End: 2021-09-02
Payer: COMMERCIAL

## 2021-09-02 VITALS
SYSTOLIC BLOOD PRESSURE: 122 MMHG | DIASTOLIC BLOOD PRESSURE: 62 MMHG | HEART RATE: 80 BPM | TEMPERATURE: 98.7 F | BODY MASS INDEX: 27.66 KG/M2 | WEIGHT: 166 LBS | RESPIRATION RATE: 16 BRPM | HEIGHT: 65 IN

## 2021-09-02 DIAGNOSIS — J45.990 EXERCISE-INDUCED ASTHMA: Primary | ICD-10-CM

## 2021-09-02 DIAGNOSIS — R06.02 SHORTNESS OF BREATH: ICD-10-CM

## 2021-09-02 PROBLEM — S01.81XA FOREHEAD LACERATION: Status: RESOLVED | Noted: 2020-10-26 | Resolved: 2021-09-02

## 2021-09-02 PROBLEM — R78.81 E COLI BACTEREMIA: Status: RESOLVED | Noted: 2019-05-11 | Resolved: 2021-09-02

## 2021-09-02 PROBLEM — B96.20 E COLI BACTEREMIA: Status: RESOLVED | Noted: 2019-05-11 | Resolved: 2021-09-02

## 2021-09-02 PROBLEM — M76.31 ILIOTIBIAL BAND SYNDROME, RIGHT LEG: Status: RESOLVED | Noted: 2019-10-24 | Resolved: 2021-09-02

## 2021-09-02 PROBLEM — R07.2 PRECORDIAL PAIN: Status: RESOLVED | Noted: 2019-05-11 | Resolved: 2021-09-02

## 2021-09-02 PROBLEM — Z47.89 AFTERCARE FOLLOWING SURGERY OF THE MUSCULOSKELETAL SYSTEM: Status: RESOLVED | Noted: 2019-02-14 | Resolved: 2021-09-02
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
## 2021-09-02 PROBLEM — R18.8 FREE FLUID IN PELVIS: Status: RESOLVED | Noted: 2020-10-27 | Resolved: 2021-09-02

## 2021-09-02 PROBLEM — N10 ACUTE PYELONEPHRITIS: Status: RESOLVED | Noted: 2019-05-09 | Resolved: 2021-09-02

## 2021-09-02 PROCEDURE — 99213 OFFICE O/P EST LOW 20 MIN: CPT | Performed by: PHYSICIAN ASSISTANT

## 2021-09-02 NOTE — PROGRESS NOTES
Assessment/Plan:    1  Exercise-induced asthma    - will do PFT and decide on treatment based on results  - Complete PFT with post bronchodilator; Future    2  Shortness of breath    - see above  - Complete PFT with post bronchodilator; Future    F/u as needed      Subjective:   Chief Complaint   Patient presents with    Follow-up     Asthma      Patient ID: Matilde Barney is a 21 y o  female  Patient was diagnosed with EIA as a teen  But never notes the trouble with exercise, mostly with waking and early morning  May started with cold and was having trouble breathing, continued through summer so in June went to Urgent Care and got prednisone treatment and helped, almost resolved 100% but then returned  Still in morning has some issues  Sometimes uses Albuterol but not always "that bad" to use it  Will help  Will spend night at boyfriends and he has cats and dogs, but noted even in [place] she had symptoms  Mom with history of asthma         The following portions of the patient's history were reviewed and updated as appropriate: allergies, current medications, past family history, past medical history, past social history, past surgical history and problem list     Past Medical History:   Diagnosis Date    Acne     Allergic     Asthma     Left buttock abscess 10/06/2017    Neoplasm of uncertain behavior 23/24/8425    of skin     Past Surgical History:   Procedure Laterality Date    ABSCESS DRAINAGE Left 10/06/2017    I & D OF LEFT BUTTOCK ABSCESS IN OFFICE-ANDREA HARRELL PA-C    WA KNEE SCOPE,MED/LAT MENISECTOMY Right 2/1/2019    Procedure: ARTHROSCOPY KNEE, RIGHT RESECTION OF ANTERIOMEDIAL PLICA;  Surgeon: Samantha Bonds MD;  Location: [facility] OR;  Service: Orthopedics     Family History   Problem Relation Age of Onset    Heart disease Father     Hyperlipidemia Father     BRUNO disease Father     Asthma Mother     Hyperlipidemia Mother     BRUON disease Mother     Diabetes Maternal Grandmother     Heart disease Maternal Grandmother     Hyperlipidemia Maternal Grandmother     Heart disease Maternal Grandfather     Hyperlipidemia Maternal Grandfather     Heart disease Paternal Grandmother     Drug abuse Neg Hx     Mental illness Neg Hx      Social History     Socioeconomic History    Marital status: Single     Spouse name: Not on file    Number of children: Not on file    Years of education: Not on file    Highest education level: Not on file   Occupational History    Not on file   Tobacco Use    Smoking status: Never Smoker    Smokeless tobacco: Never Used   Vaping Use    Vaping Use: Never used   Substance and Sexual Activity    Alcohol use: Never    Drug use: Never    Sexual activity: Not Currently   Other Topics Concern    Not on file   Social History Narrative    ** Merged History Encounter **         Always uses seatbelts  Daily cola consumption-1 can per day  Has smoke detectors       Social Determinants of Health     Financial Resource Strain:     Difficulty of Paying Living Expenses:    Food Insecurity:     Worried About Running Out of Food in the Last Year:     Ran Out of Food in the Last Year:    Transportation Needs:     Lack of Transportation (Medical):      Lack of Transportation (Non-Medical):    Physical Activity:     Days of Exercise per Week:     Minutes of Exercise per Session:    Stress:     Feeling of Stress :    Social Connections:     Frequency of Communication with Friends and Family:     Frequency of Social Gatherings with Friends and Family:     Attends Hindu Services:     Active Member of Clubs or Organizations:     Attends Club or Organization Meetings:     Marital Status:    Intimate Partner Violence:     Fear of Current or Ex-Partner:     Emotionally Abused:     Physically Abused:     Sexually Abused:        Current Outpatient Medications:     albuterol (PROVENTIL HFA,VENTOLIN HFA) 90 mcg/act inhaler, Inhale 2 puffs every 6 (six) hours as needed for wheezing, Disp: , Rfl:     norgestimate-ethinyl estradiol (Tri-Lo-Sprintec) 0 18/0 215/0 25 MG-25 MCG per tablet, Take 1 tablet by mouth daily, Disp: 84 tablet, Rfl: 0    clindamycin (CLEOCIN T) 1 % lotion, , Disp: , Rfl:     fluticasone (FLONASE) 50 mcg/act nasal spray, 2 sprays into each nostril daily as needed , Disp: , Rfl:     HYDROcodone-acetaminophen (NORCO) 5-325 mg per tablet, , Disp: , Rfl:     ibuprofen (MOTRIN) 600 mg tablet, Take 1 tablet (600 mg total) by mouth every 6 (six) hours as needed for mild pain, Disp: 30 tablet, Rfl: 0    methocarbamol (ROBAXIN) 500 mg tablet, Take 1 tablet (500 mg total) by mouth daily at bedtime as needed for muscle spasms, Disp: 15 tablet, Rfl: 0    montelukast (SINGULAIR) 10 mg tablet, Take 10 mg by mouth daily at bedtime, Disp: , Rfl:     oxyCODONE (ROXICODONE) 5 mg immediate release tablet, Take 1 tab for moderate pain  Take 2 tabs for severe pain   (Patient not taking: Reported on 11/10/2020), Disp: 20 tablet, Rfl: 0    pantoprazole (PROTONIX) 40 mg tablet, Take 1 tablet (40 mg total) by mouth daily in the early morning, Disp: 30 tablet, Rfl: 1    penicillin V potassium (VEETID) 500 mg tablet, , Disp: , Rfl:     phenazopyridine (PYRIDIUM) 100 mg tablet, Take 1 tablet (100 mg total) by mouth 3 (three) times a day as needed for bladder spasms (Patient not taking: Reported on 11/10/2020), Disp: 10 tablet, Rfl: 0    predniSONE 10 mg tablet, Take 6 tablets today, 5 tablets tomorrow, 4 the next day, 3 the next day, 2 the following and 1 the last day- all with food, Disp: 21 tablet, Rfl: 0    spironolactone (ALDACTONE) 25 mg tablet, Take 25 mg by mouth daily, Disp: , Rfl:     Review of Systems          Objective:    Vitals:    09/02/21 1029   BP: 122/62   Pulse: 80   Resp: 16   Temp: 98 7 °F (37 1 °C)   TempSrc: Oral   Weight: 75 3 kg (166 lb)   Height: 5' 5" (1 651 m)        Physical Exam  Constitutional:       Appearance: Normal appearance  She is well-developed and normal weight  Cardiovascular:      Rate and Rhythm: Normal rate and regular rhythm  Pulses: Normal pulses  Heart sounds: Normal heart sounds  Pulmonary:      Effort: Pulmonary effort is normal       Breath sounds: Normal breath sounds  Skin:     General: Skin is warm  Neurological:      General: No focal deficit present  Mental Status: She is alert and oriented to person, place, and time  Psychiatric:         Mood and Affect: Mood normal          Behavior: Behavior normal          Thought Content: Thought content normal          Judgment: Judgment normal        BMI Counseling: Body mass index is 27 62 kg/m²  The BMI is above normal  Nutrition recommendations include reducing portion sizes

## 2021-09-23 ENCOUNTER — OFFICE VISIT (OUTPATIENT)
Dept: URGENT CARE | Facility: CLINIC | Age: 21
End: 2021-09-23
Payer: COMMERCIAL

## 2021-09-23 VITALS — BODY MASS INDEX: 27.66 KG/M2 | WEIGHT: 166 LBS | HEIGHT: 65 IN

## 2021-09-23 DIAGNOSIS — J02.8 ACUTE PHARYNGITIS DUE TO OTHER SPECIFIED ORGANISMS: ICD-10-CM

## 2021-09-23 DIAGNOSIS — J06.9 ACUTE UPPER RESPIRATORY INFECTION: Primary | ICD-10-CM

## 2021-09-23 DIAGNOSIS — R68.89 FLU-LIKE SYMPTOMS: ICD-10-CM

## 2021-09-23 PROCEDURE — 87070 CULTURE OTHR SPECIMN AEROBIC: CPT | Performed by: NURSE PRACTITIONER

## 2021-09-23 PROCEDURE — U0005 INFEC AGEN DETEC AMPLI PROBE: HCPCS | Performed by: NURSE PRACTITIONER

## 2021-09-23 PROCEDURE — U0003 INFECTIOUS AGENT DETECTION BY NUCLEIC ACID (DNA OR RNA); SEVERE ACUTE RESPIRATORY SYNDROME CORONAVIRUS 2 (SARS-COV-2) (CORONAVIRUS DISEASE [COVID-19]), AMPLIFIED PROBE TECHNIQUE, MAKING USE OF HIGH THROUGHPUT TECHNOLOGIES AS DESCRIBED BY CMS-2020-01-R: HCPCS | Performed by: NURSE PRACTITIONER

## 2021-09-23 PROCEDURE — G0382 LEV 3 HOSP TYPE B ED VISIT: HCPCS | Performed by: NURSE PRACTITIONER

## 2021-09-23 NOTE — LETTER
September 23, 2021     Patient: Ivanna Clayton   YOB: 2000   Date of Visit: 9/23/2021       To Whom It May Concern: It is my medical opinion that Avel Del Angel no work until [employer] are back  If you have any questions or concerns, please don't hesitate to call           Sincerely,        EKATERINA Mace    CC: No Recipients

## 2021-09-23 NOTE — PROGRESS NOTES
[facility]        NAME: Geneva Kang is a 21 y o  female  : 2000    MRN: [id number]  DATE: 2021  TIME: 7:12 PM    Assessment and Plan   Acute upper respiratory infection [J06 9]  1  Acute upper respiratory infection  Throat culture   2  Flu-like symptoms  Novel Coronavirus (Covid-19[facility] - [facility]    Throat culture   3  Acute pharyngitis due to other specified organisms  Throat culture         Patient Instructions       Follow up with PCP in 3-5 days  Proceed to  ER if symptoms worsen  You have a covid and throat culture pending - you are to check  mychart for the results - you will be notified if they are +  You are to quarantine until the results are back  No work  You are to take dayquil, tylenol, motrin, vit d and c  Increase fluids  Follow up with your PCP  Go to the ED if symptoms worsen            Chief Complaint     Chief Complaint   Patient presents with    Sore Throat     for a day    Cough     2-3 days    Headache     for a week    Sinusitis     for a week    COVID-19     did not get covid shot         History of Present Illness       This is a 21year old female who has had 2-3 days of sorethroat, cough, headache x 1 week, sinus problems x 1 week  Works at [address] [place] [address] [employer]  No covid vaccination  Unknown covid exposure  Denies pregnancy     Sore Throat   Associated symptoms include congestion, coughing and headaches  Cough  Associated symptoms include headaches and a sore throat  Headache   Associated symptoms include coughing, sinus pressure and a sore throat  Sinusitis  Associated symptoms include congestion, coughing, headaches, sinus pressure and a sore throat  Review of Systems   Review of Systems   Constitutional: Negative  HENT: Positive for congestion, sinus pressure, sinus pain and sore throat  Eyes: Negative  Respiratory: Positive for cough  Cardiovascular: Negative  Gastrointestinal: Negative  Endocrine: Negative  Genitourinary: Negative  Musculoskeletal: Negative  Skin: Negative  Allergic/Immunologic: Negative  Neurological: Positive for headaches  Hematological: Negative  Psychiatric/Behavioral: Negative  Current Medications       Current Outpatient Medications:     albuterol (PROVENTIL HFA,VENTOLIN HFA) 90 mcg/act inhaler, Inhale 2 puffs every 6 (six) hours as needed for wheezing, Disp: , Rfl:     Current Allergies     Allergies as of 09/23/2021 - Reviewed 09/23/2021   Allergen Reaction Noted    Oxycodone Hives 11/01/2020            The following portions of the patient's history were reviewed and updated as appropriate: allergies, current medications, past family history, past medical history, past social history, past surgical history and problem list      Past Medical History:   Diagnosis Date    Acne     Allergic     Asthma     Left buttock abscess 10/06/2017    Neoplasm of uncertain behavior 51/71/9929    of skin       Past Surgical History:   Procedure Laterality Date    ABSCESS DRAINAGE Left 10/06/2017    I & D OF LEFT BUTTOCK ABSCESS IN OFFICE-JENNY BECERRIL KNEE SCOPE,MED/LAT MENISECTOMY Right 2/1/2019    Procedure: ARTHROSCOPY KNEE, RIGHT RESECTION OF ANTERIOMEDIAL PLICA;  Surgeon: Debra Romero MD;  Location:  MAIN OR;  Service: Orthopedics       Family History   Problem Relation Age of Onset    Heart disease Father     Hyperlipidemia Father     BRUNO disease Father     Asthma Mother     Hyperlipidemia Mother     BRUNO disease Mother     Diabetes Maternal Grandmother     Heart disease Maternal Grandmother     Hyperlipidemia Maternal Grandmother     Heart disease Maternal Grandfather     Hyperlipidemia Maternal Grandfather     Heart disease Paternal Grandmother     Drug abuse Neg Hx     Mental illness Neg Hx          Medications have been verified          Objective   Ht 5' 5" (1 651 m)   Wt 75 3 kg (166 lb)   BMI 27 62 kg/m²   No LMP recorded  Physical Exam     Physical Exam  Vitals and nursing note reviewed  Constitutional:       General: She is not in acute distress  Appearance: She is well-developed and normal weight  She is not ill-appearing, toxic-appearing or diaphoretic  HENT:      Head: Normocephalic and atraumatic  Right Ear: Tympanic membrane and ear canal normal       Left Ear: Tympanic membrane and ear canal normal       Nose: No congestion or rhinorrhea  Mouth/Throat:      Mouth: Mucous membranes are moist       Pharynx: No posterior oropharyngeal erythema  Tonsils: No tonsillar exudate or tonsillar abscesses  0 on the right  0 on the left  Eyes:      Conjunctiva/sclera: Conjunctivae normal       Pupils: Pupils are equal, round, and reactive to light  Cardiovascular:      Rate and Rhythm: Normal rate and regular rhythm  Heart sounds: Normal heart sounds  Pulmonary:      Effort: Pulmonary effort is normal       Breath sounds: Normal breath sounds  Abdominal:      General: Bowel sounds are normal       Palpations: Abdomen is soft  Musculoskeletal:      Cervical back: Normal range of motion  Skin:     General: Skin is warm and dry  Capillary Refill: Capillary refill takes less than 2 seconds  Neurological:      General: No focal deficit present  Mental Status: She is alert and oriented to person, place, and time     Psychiatric:         Mood and Affect: Mood normal          Behavior: Behavior normal

## 2021-09-23 NOTE — PATIENT INSTRUCTIONS
You have a covid and throat culture pending - you are to check [facility] mychart for the results - you will be notified if they are +  You are to quarantine until the results are back  No work  You are to take dayquil, tylenol, motrin, vit d and c  Increase fluids  Follow up with your PCP  Go to the ED if symptoms worsen    Upper Respiratory Infection   WHAT YOU NEED TO KNOW:   An upper respiratory infection is also called a cold  It can affect your nose, throat, ears, and sinuses  Cold symptoms are usually worst for the first 3 to 5 days  Most people get better in 7 to 14 days  You may continue to cough for 2 to 3 weeks  Colds are caused by viruses and do not get better with antibiotics  DISCHARGE INSTRUCTIONS:   Call your local emergency number (911 in the [address] [facility]) if:   · You have chest pain or trouble breathing  Return to the emergency department if:   · You have a fever over 102ºF (39ºC)  Call your doctor if:   · You have a low fever  · Your sore throat gets worse or you see white or yellow spots in your throat  · Your symptoms get worse after 3 to 5 days or are not better in 14 days  · You have a rash anywhere on your skin  · You have large, tender lumps in your neck  · You have thick, green, or yellow drainage from your nose  · You cough up thick yellow, green, or bloody mucus  · You have a bad earache  · You have questions or concerns about your condition or care  Medicines: You may need any of the following:  · Decongestants  help reduce nasal congestion and help you breathe more easily  If you take decongestant pills, they may make you feel restless or cause problems with your sleep  Do not use decongestant sprays for more than a few days  · Cough suppressants  help reduce coughing  Ask your healthcare provider which type of cough medicine is best for you  · NSAIDs , such as ibuprofen, help decrease swelling, pain, and fever   NSAIDs can cause stomach bleeding or kidney problems in certain people  If you take blood thinner medicine, always ask your healthcare provider if NSAIDs are safe for you  Always read the medicine label and follow directions  · Acetaminophen  decreases pain and fever  It is available without a doctor's order  Ask how much to take and how often to take it  Follow directions  Read the labels of all other medicines you are using to see if they also contain acetaminophen, or ask your doctor or pharmacist  Acetaminophen can cause liver damage if not taken correctly  Do not use more than 4 grams (4,000 milligrams) total of acetaminophen in one day  · Take your medicine as directed  Contact your healthcare provider if you think your medicine is not helping or if you have side effects  Tell him or her if you are allergic to any medicine  Keep a list of the medicines, vitamins, and herbs you take  Include the amounts, and when and why you take them  Bring the list or the pill bottles to follow-up visits  Carry your medicine list with you in case of an emergency  Self-care:   · Rest as much as possible  Slowly start to do more each day  · Drink more liquids as directed  Liquids will help thin and loosen mucus so you can cough it up  Liquids will also help prevent dehydration  Liquids that help prevent dehydration include water, fruit juice, and broth  Do not drink liquids that contain caffeine  Caffeine can increase your risk for dehydration  Ask your healthcare provider how much liquid to drink each day  · Soothe a sore throat  Gargle with warm salt water  Make salt water by dissolving ¼ teaspoon salt in 1 cup warm water  You may also suck on hard candy or throat lozenges  You may use a sore throat spray  · Use a humidifier or vaporizer  Use a cool mist humidifier or a vaporizer to increase air moisture in your home  This may make it easier for you to breathe and help decrease your cough  · Use saline nasal drops as directed    These help relieve congestion  · Apply petroleum-based jelly around the outside of your nostrils  This can decrease irritation from blowing your nose  · Do not smoke  Nicotine and other chemicals in cigarettes and cigars can make your symptoms worse  They can also cause infections such as bronchitis or pneumonia  Ask your healthcare provider for information if you currently smoke and need help to quit  E-cigarettes or smokeless tobacco still contain nicotine  Talk to your healthcare provider before you use these products  Prevent a cold:   · Wash your hands often  Use soap and water every time you wash your hands  Rub your soapy hands together, lacing your fingers  Use the fingers of one hand to scrub under the nails of the other hand  Wash for at least 20 seconds  Rinse with warm, running water for several seconds  Then dry your hands  Use germ-killing gel if soap and water are not available  Do not touch your eyes or mouth without washing your hands first          · Cover a sneeze or cough  Use a tissue that covers your mouth and nose  Put the used tissue in the trash right away  Use the bend of your arm if a tissue is not available  Wash your hands well with soap and water or use a hand   Do not stand close to anyone who is sneezing or coughing  · Try to stay away from others while you are sick  This is especially important during the first 2 to 3 days when the virus is more easily spread  Wait until a fever, cough, or other symptoms are gone before you return to work or other regular activities  · Do not share items while you are sick  This includes food, drinks, eating utensils, and dishes  Follow up with your doctor as directed:  Write down your questions so you remember to ask them during your visits  © Copyright [employer] 2021 Information is for End User's use only and may not be sold, redistributed or otherwise used for commercial purposes   All illustrations and images included in [facility] are the copyrighted property of [employer]  or [address] [place], [place] [address]em[address]   The above information is an  only  It is not intended as medical advice for individual conditions or treatments  Talk to your doctor, nurse or pharmacist before following any medical regimen to see if it is safe and effective for you

## 2021-09-24 LAB — SARS-COV-2 RNA RESP QL NAA+PROBE: NEGATIVE

## 2021-09-25 LAB — BACTERIA THROAT CULT: NORMAL

## 2021-12-03 ENCOUNTER — CLINICAL SUPPORT (OUTPATIENT)
Dept: FAMILY MEDICINE CLINIC | Facility: CLINIC | Age: 21
End: 2021-12-03

## 2021-12-03 DIAGNOSIS — Z20.822 SUSPECTED COVID-19 VIRUS INFECTION: Primary | ICD-10-CM

## 2021-12-03 PROCEDURE — U0003 INFECTIOUS AGENT DETECTION BY NUCLEIC ACID (DNA OR RNA); SEVERE ACUTE RESPIRATORY SYNDROME CORONAVIRUS 2 (SARS-COV-2) (CORONAVIRUS DISEASE [COVID-19]), AMPLIFIED PROBE TECHNIQUE, MAKING USE OF HIGH THROUGHPUT TECHNOLOGIES AS DESCRIBED BY CMS-2020-01-R: HCPCS | Performed by: PHYSICIAN ASSISTANT

## 2021-12-03 PROCEDURE — U0005 INFEC AGEN DETEC AMPLI PROBE: HCPCS | Performed by: PHYSICIAN ASSISTANT

## 2021-12-04 LAB — SARS-COV-2 RNA RESP QL NAA+PROBE: NEGATIVE

## 2021-12-06 ENCOUNTER — TELEPHONE (OUTPATIENT)
Dept: FAMILY MEDICINE CLINIC | Facility: CLINIC | Age: 21
End: 2021-12-06

## 2021-12-07 ENCOUNTER — OFFICE VISIT (OUTPATIENT)
Dept: FAMILY MEDICINE CLINIC | Facility: CLINIC | Age: 21
End: 2021-12-07
Payer: COMMERCIAL

## 2021-12-07 VITALS
HEART RATE: 65 BPM | HEIGHT: 66 IN | SYSTOLIC BLOOD PRESSURE: 110 MMHG | OXYGEN SATURATION: 98 % | TEMPERATURE: 98 F | WEIGHT: 170.6 LBS | RESPIRATION RATE: 16 BRPM | DIASTOLIC BLOOD PRESSURE: 72 MMHG | BODY MASS INDEX: 27.42 KG/M2

## 2021-12-07 DIAGNOSIS — R09.81 SINUS CONGESTION: ICD-10-CM

## 2021-12-07 DIAGNOSIS — Z00.00 ANNUAL PHYSICAL EXAM: Primary | ICD-10-CM

## 2021-12-07 PROCEDURE — 99395 PREV VISIT EST AGE 18-39: CPT | Performed by: PHYSICIAN ASSISTANT

## 2021-12-07 PROCEDURE — 99213 OFFICE O/P EST LOW 20 MIN: CPT | Performed by: PHYSICIAN ASSISTANT

## 2021-12-23 ENCOUNTER — OFFICE VISIT (OUTPATIENT)
Dept: URGENT CARE | Facility: CLINIC | Age: 21
End: 2021-12-23
Payer: COMMERCIAL

## 2021-12-23 VITALS
BODY MASS INDEX: 27.32 KG/M2 | RESPIRATION RATE: 18 BRPM | WEIGHT: 170 LBS | OXYGEN SATURATION: 98 % | HEIGHT: 66 IN | TEMPERATURE: 97.2 F | HEART RATE: 71 BPM

## 2021-12-23 DIAGNOSIS — B34.9 ACUTE VIRAL SYNDROME: Primary | ICD-10-CM

## 2021-12-23 PROCEDURE — G0382 LEV 3 HOSP TYPE B ED VISIT: HCPCS | Performed by: PHYSICIAN ASSISTANT

## 2021-12-23 PROCEDURE — U0005 INFEC AGEN DETEC AMPLI PROBE: HCPCS | Performed by: PHYSICIAN ASSISTANT

## 2021-12-23 PROCEDURE — U0003 INFECTIOUS AGENT DETECTION BY NUCLEIC ACID (DNA OR RNA); SEVERE ACUTE RESPIRATORY SYNDROME CORONAVIRUS 2 (SARS-COV-2) (CORONAVIRUS DISEASE [COVID-19]), AMPLIFIED PROBE TECHNIQUE, MAKING USE OF HIGH THROUGHPUT TECHNOLOGIES AS DESCRIBED BY CMS-2020-01-R: HCPCS | Performed by: PHYSICIAN ASSISTANT

## 2021-12-25 LAB — SARS-COV-2 RNA RESP QL NAA+PROBE: POSITIVE

## 2022-01-28 ENCOUNTER — TELEPHONE (OUTPATIENT)
Dept: FAMILY MEDICINE CLINIC | Facility: CLINIC | Age: 22
End: 2022-01-28

## 2022-01-28 NOTE — TELEPHONE ENCOUNTER
----- Message from Triston Lenz PA-C sent at 1/25/2022 12:55 PM EST -----  Regarding: FW: Covid Test    Patient may need a note  to travel, her positive covid test is in Epic from 12/23/2021  If her covid test still comes back positive you can write a note saying that she had covid 12/23/2021 and is no longer contagious and can travel  Thank you!  ----- Message -----  From: Juan Antonio Evangelista MA  Sent: 1/25/2022  12:04 PM EST  To: Triston Lenz PA-C  Subject: FW: Covid Test                                     ----- Message -----  From: Robert Walsh  Sent: 1/24/2022   1:42 PM EST  To: , #  Subject: Covid Test                                       Hi Doctor Ellsworth Homans, my family is going on a trip February 1st to [facility]  We need a negative Covid test before we go and I have an appointment Thursday so Ill be getting my results back Monday  If it comes back positive Ill need a travel note but I cant get an appointment the 31st  Do I need an appointment for that? Ps  I had Covid last month so I dont see how it would be positive! Thank you for your time  Have a great rest of your day!

## 2022-02-21 ENCOUNTER — HOSPITAL ENCOUNTER (OUTPATIENT)
Dept: PULMONOLOGY | Facility: HOSPITAL | Age: 22
Discharge: HOME | End: 2022-02-21
Payer: COMMERCIAL

## 2022-02-21 DIAGNOSIS — R06.02 SHORTNESS OF BREATH: ICD-10-CM

## 2022-02-21 DIAGNOSIS — J45.990 EXERCISE-INDUCED ASTHMA: ICD-10-CM

## 2022-02-21 PROCEDURE — 94060 EVALUATION OF WHEEZING: CPT | Performed by: INTERNAL MEDICINE

## 2022-02-21 PROCEDURE — 94060 EVALUATION OF WHEEZING: CPT

## 2022-02-21 PROCEDURE — 94726 PLETHYSMOGRAPHY LUNG VOLUMES: CPT

## 2022-02-21 PROCEDURE — 94726 PLETHYSMOGRAPHY LUNG VOLUMES: CPT | Performed by: INTERNAL MEDICINE

## 2022-02-21 PROCEDURE — 94729 DIFFUSING CAPACITY: CPT | Performed by: INTERNAL MEDICINE

## 2022-02-21 PROCEDURE — 94760 N-INVAS EAR/PLS OXIMETRY 1: CPT

## 2022-02-21 PROCEDURE — 94729 DIFFUSING CAPACITY: CPT

## 2022-02-21 RX ORDER — ALBUTEROL SULFATE 2.5 MG/3ML
2.5 SOLUTION RESPIRATORY (INHALATION) EVERY 6 HOURS PRN
Status: DISCONTINUED | OUTPATIENT
Start: 2022-02-21 | End: 2022-02-25 | Stop reason: HOSPADM

## 2022-02-21 RX ORDER — LEVALBUTEROL 1.25 MG/.5ML
1.25 SOLUTION, CONCENTRATE RESPIRATORY (INHALATION) EVERY 8 HOURS PRN
Status: DISCONTINUED | OUTPATIENT
Start: 2022-02-21 | End: 2022-02-21

## 2022-02-21 RX ADMIN — ALBUTEROL SULFATE 2.5 MG: 2.5 SOLUTION RESPIRATORY (INHALATION) at 15:55

## 2022-03-01 ENCOUNTER — TELEPHONE (OUTPATIENT)
Dept: FAMILY MEDICINE CLINIC | Facility: CLINIC | Age: 22
End: 2022-03-01

## 2022-03-01 NOTE — TELEPHONE ENCOUNTER
----- Message from Robb Duran PA-C sent at 3/1/2022 11:29 AM EST -----  Please let patient know her PFT was normal

## 2022-08-16 ENCOUNTER — OFFICE VISIT (OUTPATIENT)
Dept: OBGYN CLINIC | Facility: CLINIC | Age: 22
End: 2022-08-16
Payer: COMMERCIAL

## 2022-08-16 VITALS
HEIGHT: 66 IN | BODY MASS INDEX: 27.19 KG/M2 | WEIGHT: 169.2 LBS | SYSTOLIC BLOOD PRESSURE: 114 MMHG | DIASTOLIC BLOOD PRESSURE: 64 MMHG

## 2022-08-16 DIAGNOSIS — Z12.4 CERVICAL CANCER SCREENING: Primary | ICD-10-CM

## 2022-08-16 DIAGNOSIS — Z01.419 ENCOUNTER FOR ANNUAL ROUTINE GYNECOLOGICAL EXAMINATION: ICD-10-CM

## 2022-08-16 DIAGNOSIS — Z11.3 SCREEN FOR STD (SEXUALLY TRANSMITTED DISEASE): ICD-10-CM

## 2022-08-16 PROCEDURE — 87491 CHLMYD TRACH DNA AMP PROBE: CPT | Performed by: OBSTETRICS & GYNECOLOGY

## 2022-08-16 PROCEDURE — 87591 N.GONORRHOEAE DNA AMP PROB: CPT | Performed by: OBSTETRICS & GYNECOLOGY

## 2022-08-16 PROCEDURE — S0612 ANNUAL GYNECOLOGICAL EXAMINA: HCPCS | Performed by: OBSTETRICS & GYNECOLOGY

## 2022-08-16 PROCEDURE — G0145 SCR C/V CYTO,THINLAYER,RESCR: HCPCS | Performed by: OBSTETRICS & GYNECOLOGY

## 2022-08-16 NOTE — PROGRESS NOTES
Assessment/Plan:    Pap with reflex done    Chlamydia and gonorrhea done     Discussed self breast exams    Mild stress incontinence-we discussed conservative measures such as Kegel's and avoiding bladder irritants  We also discussed pelvic floor PT and she was given information on this  She will call if she wants to get a referral     She will keep track of her menstrual cycles and if she needs contraception, she will call  discussed preventive care, regular exercise and a healthy diet      No problem-specific Assessment & Plan notes found for this encounter  Diagnoses and all orders for this visit:    Cervical cancer screening  -     Liquid-based pap, screening    Encounter for annual routine gynecological examination  -     Liquid-based pap, screening    Screen for STD (sexually transmitted disease)  -     Chlamydia/GC amplified DNA by PCR          Subjective:      Patient ID: Elsie Nicholas is a 24 y o  female  New Pt - 24year old female presents for 1st yearly  She has no complaints  Menstrual cycles are regular  No significant past medical history, surgical history includes a knee arthroscopy  She has been sexually active in the past but she is not currently  She does sometimes have mild stress incontinence  She states that her sister had a procedure for this and her mother and grandmother also have had similar symptoms  The following portions of the patient's history were reviewed and updated as appropriate: allergies, current medications, past family history, past medical history, past social history, past surgical history and problem list     Review of Systems   Constitutional: Negative  Gastrointestinal: Negative  Genitourinary: Negative            Objective:      /64 (BP Location: Left arm, Patient Position: Sitting, Cuff Size: Standard)   Ht 5' 5 5" (1 664 m)   Wt 76 7 kg (169 lb 3 2 oz)   LMP 08/02/2022 (Exact Date)   BMI 27 73 kg/m²          Physical Exam  Vitals reviewed  Constitutional:       Appearance: She is well-developed  Neck:      Thyroid: No thyromegaly  Trachea: No tracheal deviation  Cardiovascular:      Rate and Rhythm: Normal rate and regular rhythm  Pulmonary:      Effort: Pulmonary effort is normal       Breath sounds: Normal breath sounds  Chest:   Breasts: Breasts are symmetrical       Right: No inverted nipple, mass, nipple discharge, skin change or tenderness  Left: No inverted nipple, mass, nipple discharge, skin change or tenderness  Abdominal:      General: There is no distension  Palpations: Abdomen is soft  There is no mass  Tenderness: There is no abdominal tenderness  Genitourinary:     Labia:         Right: No rash, tenderness, lesion or injury  Left: No rash, tenderness, lesion or injury  Vagina: Normal       Cervix: No cervical motion tenderness, discharge or friability  Adnexa:         Right: No mass, tenderness or fullness  Left: No mass, tenderness or fullness

## 2022-08-18 LAB
C TRACH DNA SPEC QL NAA+PROBE: NEGATIVE
N GONORRHOEA DNA SPEC QL NAA+PROBE: NEGATIVE

## 2022-08-19 LAB
LAB AP GYN PRIMARY INTERPRETATION: NORMAL
LAB AP LMP: NORMAL
Lab: NORMAL

## 2023-03-21 ENCOUNTER — OFFICE VISIT (OUTPATIENT)
Dept: FAMILY MEDICINE CLINIC | Facility: CLINIC | Age: 23
End: 2023-03-21

## 2023-03-21 VITALS
HEIGHT: 66 IN | BODY MASS INDEX: 29.89 KG/M2 | RESPIRATION RATE: 16 BRPM | HEART RATE: 70 BPM | OXYGEN SATURATION: 97 % | SYSTOLIC BLOOD PRESSURE: 104 MMHG | WEIGHT: 186 LBS | DIASTOLIC BLOOD PRESSURE: 72 MMHG

## 2023-03-21 DIAGNOSIS — R21 RASH: Primary | ICD-10-CM

## 2023-03-21 NOTE — PROGRESS NOTES
Assessment/Plan:    1  Rash - left upper thigh, almost resolved today, will check lyme titers and treat is positive    F/u as needed    Subjective:   Chief Complaint   Patient presents with   • Rash     Bulls-eye like rash on left thigh  First noticed on Friday/Saturday  No known tick bite  No acute joint pain, fatigue        Patient ID: Mike Pike is a 25 y o  female  Sunday noted what she thought was an infected hair follicle, was able to express some oil, minimal purulent material  Yesterday noticed a bulls eye rash on left upper thigh  Headache all day yesterday  Denies fever, chills, joint pain  Tried no OTC   Today smaller but still there,       The following portions of the patient's history were reviewed and updated as appropriate: allergies, current medications, past family history, past medical history, past social history, past surgical history and problem list     Past Medical History:   Diagnosis Date   • Acne    • Allergic    • Asthma    • Left buttock abscess 10/06/2017   • Neoplasm of uncertain behavior 07/92/7084    of skin     Past Surgical History:   Procedure Laterality Date   • ABSCESS DRAINAGE Left 10/06/2017    I & D OF LEFT BUTTOCK ABSCESS IN OFFICE-ANDREA HARRELL PA-C   • [place] ARTHRS KNE SURG W/MENISCECTOMY MED/LAT W/SHVG Right 2/1/2019    Procedure: ARTHROSCOPY KNEE, RIGHT RESECTION OF ANTERIOMEDIAL PLICA;  Surgeon: Nura Rene MD;  Location[facility] OR;  Service: Orthopedics     Family History   Problem Relation Age of Onset   • Asthma Mother    • Hyperlipidemia Mother    • BRUNO disease Mother    • Heart disease Father    • Hyperlipidemia Father    • BRUNO disease Father    • Stroke Maternal Grandmother    • Hypertension Maternal Grandmother    • Breast cancer Maternal Grandmother    • Diabetes Maternal Grandmother    • Heart disease Maternal Grandmother    • Hyperlipidemia Maternal Grandmother    • Heart disease Maternal Grandfather    • Hyperlipidemia Maternal Grandfather    • Heart disease Paternal Grandmother    • Heart disease Paternal Grandfather    • Drug abuse Neg Hx    • Mental illness Neg Hx      Social History     Socioeconomic History   • Marital status: Single     Spouse name: Not on file   • Number of children: Not on file   • Years of education: Not on file   • Highest education level: Not on file   Occupational History   • Not on file   Tobacco Use   • Smoking status: Never   • Smokeless tobacco: Never   Vaping Use   • Vaping Use: Never used   Substance and Sexual Activity   • Alcohol use: Never   • Drug use: Never   • Sexual activity: Not Currently   Other Topics Concern   • Not on file   Social History Narrative    ** Merged History Encounter **         Always uses seatbelts  Daily cola consumption-1 can per day  Has smoke detectors       Social Determinants of Health     Financial Resource Strain: Not on file   Food Insecurity: Not on file   Transportation Needs: Not on file   Physical Activity: Not on file   Stress: Not on file   Social Connections: Not on file   Intimate Partner Violence: Not on file   Housing Stability: Not on file       Current Outpatient Medications:   •  albuterol (PROVENTIL HFA,VENTOLIN HFA) 90 mcg/act inhaler, Inhale 2 puffs every 6 (six) hours as needed for wheezing, Disp: , Rfl:     Review of Systems          Objective:    Vitals:    03/21/23 0904   BP: 104/72   BP Location: Left arm   Patient Position: Sitting   Cuff Size: Standard   Pulse: 70   Resp: 16   SpO2: 97%   Weight: 84 4 kg (186 lb)   Height: 5' 5 5" (1 664 m)        Physical Exam  Constitutional:       Appearance: Normal appearance  HENT:      Head: Normocephalic and atraumatic  Skin:     Comments: Left upper thigh was scabbed macular 1mm  lesion, otherwise normal, no erythema, induration, warmth, tenderness   Neurological:      General: No focal deficit present  Mental Status: She is alert and oriented to person, place, and time     Psychiatric:         Mood and Affect: Mood normal          Behavior: Behavior normal          Thought Content:  Thought content normal          Judgment: Judgment normal

## 2023-03-23 LAB

## 2023-04-05 ENCOUNTER — OFFICE VISIT (OUTPATIENT)
Dept: FAMILY MEDICINE CLINIC | Facility: CLINIC | Age: 23
End: 2023-04-05

## 2023-04-05 VITALS
HEART RATE: 74 BPM | BODY MASS INDEX: 30.37 KG/M2 | RESPIRATION RATE: 16 BRPM | HEIGHT: 66 IN | SYSTOLIC BLOOD PRESSURE: 136 MMHG | OXYGEN SATURATION: 98 % | WEIGHT: 189 LBS | DIASTOLIC BLOOD PRESSURE: 80 MMHG

## 2023-04-05 DIAGNOSIS — R19.00 MASS OF SOFT TISSUE OF ABDOMEN: Primary | ICD-10-CM

## 2023-04-05 NOTE — PROGRESS NOTES
Assessment/Plan:    1  Soft tissue mass epigastric region - possible lipoma, US ordered for area, reassurance offered    F/u as needed      Subjective:   Chief Complaint   Patient presents with   • skin lump     Noticed last night  Not painful      Patient ID: Deonte Foster is a 25 y o  female  Patient with a new lump under left breast, hard, movable  Not tender  Just noticed it last night  No trauma  Tried nothing to it        The following portions of the patient's history were reviewed and updated as appropriate: allergies, current medications, past family history, past medical history, past social history, past surgical history and problem list     Past Medical History:   Diagnosis Date   • Acne    • Allergic    • Asthma    • Left buttock abscess 10/06/2017   • Neoplasm of uncertain behavior 41/71/9118    of skin     Past Surgical History:   Procedure Laterality Date   • ABSCESS DRAINAGE Left 10/06/2017    I & D OF LEFT BUTTOCK ABSCESS IN OFFICE-ANDREA HARRELL PA-C   • [place] ARTHRS KNE SURG W/MENISCECTOMY MED/LAT W/SHVG Right 2/1/2019    Procedure: ARTHROSCOPY KNEE, RIGHT RESECTION OF ANTERIOMEDIAL PLICA;  Surgeon: Maximiliano Linares MD;  Location:  MAIN OR;  Service: Orthopedics     Family History   Problem Relation Age of Onset   • Asthma Mother    • Hyperlipidemia Mother    • BRUNO disease Mother    • Heart disease Father    • Hyperlipidemia Father    • BRUNO disease Father    • Stroke Maternal Grandmother    • Hypertension Maternal Grandmother    • Breast cancer Maternal Grandmother    • Diabetes Maternal Grandmother    • Heart disease Maternal Grandmother    • Hyperlipidemia Maternal Grandmother    • Heart disease Maternal Grandfather    • Hyperlipidemia Maternal Grandfather    • Heart disease Paternal Grandmother    • Heart disease Paternal Grandfather    • Drug abuse Neg Hx    • Mental illness Neg Hx      Social History     Socioeconomic History   • Marital status: Single     Spouse name: Not on file   • "Number of children: Not on file   • Years of education: Not on file   • Highest education level: Not on file   Occupational History   • Not on file   Tobacco Use   • Smoking status: Never   • Smokeless tobacco: Never   Vaping Use   • Vaping Use: Never used   Substance and Sexual Activity   • Alcohol use: Never   • Drug use: Never   • Sexual activity: Not Currently   Other Topics Concern   • Not on file   Social History Narrative    ** Merged History Encounter **         Always uses seatbelts  Daily cola consumption-1 can per day  Has smoke detectors       Social Determinants of Health     Financial Resource Strain: Not on file   Food Insecurity: Not on file   Transportation Needs: Not on file   Physical Activity: Not on file   Stress: Not on file   Social Connections: Not on file   Intimate Partner Violence: Not on file   Housing Stability: Not on file       Current Outpatient Medications:   •  albuterol (PROVENTIL HFA,VENTOLIN HFA) 90 mcg/act inhaler, Inhale 2 puffs every 6 (six) hours as needed for wheezing, Disp: , Rfl:     Review of Systems          Objective:    Vitals:    04/05/23 1006   BP: 136/80   BP Location: Left arm   Patient Position: Sitting   Cuff Size: Large   Pulse: 74   Resp: 16   SpO2: 98%   Weight: 85 7 kg (189 lb)   Height: 5' 5 5\" (1 664 m)        Physical Exam  Constitutional:       Appearance: Normal appearance  HENT:      Head: Normocephalic and atraumatic  Abdominal:      Comments: Epigastric region with 3 cm x 3 cm soft, freely movable, not tender mass   Skin:     General: Skin is warm  Neurological:      General: No focal deficit present  Mental Status: She is alert and oriented to person, place, and time  Psychiatric:         Mood and Affect: Mood normal          Behavior: Behavior normal          Thought Content:  Thought content normal          Judgment: Judgment normal                "

## 2023-07-21 ENCOUNTER — OFFICE VISIT (OUTPATIENT)
Dept: PLASTIC SURGERY | Facility: HOSPITAL | Age: 23
End: 2023-07-21
Payer: COMMERCIAL

## 2023-07-21 VITALS — WEIGHT: 187 LBS | BODY MASS INDEX: 30.05 KG/M2 | HEIGHT: 66 IN

## 2023-07-21 DIAGNOSIS — D17.1 LIPOMA OF ANTERIOR CHEST WALL: Primary | ICD-10-CM

## 2023-07-21 PROCEDURE — 99244 OFF/OP CNSLTJ NEW/EST MOD 40: CPT | Performed by: SURGERY

## 2023-07-21 NOTE — PROGRESS NOTES
Assessment/Plan: Please see HPI. Discussed excision of the subcutaneous mass/lipoma of the chest/upper abdomen. She would also eventually like to discuss options for treatment of an unfavorable scar of the central chest, intermammary region. I explained her that it would not be wise to address this at the time of the lipoma excision. We will discuss this in further detail at another visit. I discussed how the procedure will be performed, as well as potential risk, complications and limitations. Her questions were answered to her satisfaction and consent has been obtained. She strongly would prefer to have this performed with general anesthesia. She will work with our coordinator to arrange a date for the procedure. There are no diagnoses linked to this encounter. Subjective: Lipoma     Patient ID: Karri Shahid is a 25 y.o. female. MILLY Hannahyolischiara a pleasant 80-year-old female, referred by Jodie Pompa PA-C for treatment of a lipoma of the chest/upper abdomen. Larissa Thomas reports first noticing a prominence in this area a few months ago, it has enlarged somewhat in size and become uncomfortable such that it creates a "pressure" sensation due to its size and location. Recent ultrasound of the area involved (xiphoid) reveals a  4.9 x 3.6 x 2.1 cm mass, consistent with a lipoma. The following portions of the patient's history were reviewed and updated as appropriate: allergies, current medications, past family history, past medical history, past social history, past surgical history and problem list.    Review of Systems   Constitutional: Negative for chills and fever. HENT: Negative for hearing loss. Eyes: Negative for discharge and visual disturbance. Respiratory: Negative for chest tightness and shortness of breath. Cardiovascular: Negative for chest pain and leg swelling. Gastrointestinal: Negative for blood in stool, constipation, diarrhea and nausea.    Genitourinary: Negative for dysuria. Musculoskeletal: Negative for gait problem. Skin: Negative for rash. Allergic/Immunologic: Negative for immunocompromised state. Neurological: Negative for seizures and headaches. Hematological: Does not bruise/bleed easily. Psychiatric/Behavioral: Negative for dysphoric mood. The patient is not nervous/anxious. Objective:      Ht 5' 5.5" (1.664 m)   Wt 84.8 kg (187 lb)   BMI 30.65 kg/m²          Physical Exam  HENT:      Head: Normocephalic and atraumatic. Eyes:      Extraocular Movements: Extraocular movements intact. Pupils: Pupils are equal, round, and reactive to light. Cardiovascular:      Rate and Rhythm: Normal rate. Pulmonary:      Effort: Pulmonary effort is normal.   Abdominal:      Palpations: Abdomen is soft. Musculoskeletal:         General: Normal range of motion. Cervical back: Normal range of motion and neck supple. Skin:     General: Skin is warm. Comments: Visible, palpable, soft subcutaneous mass xiphoid region, nontender, approximately 5 cm x 4 cm, see photo, see ultrasound result   Neurological:      Mental Status: She is alert and oriented to person, place, and time.    Psychiatric:         Mood and Affect: Mood normal.

## 2023-07-21 NOTE — LETTER
July 21, 2023     Maria Luisa Kwan, [facility],[address], [address] [address]    Patient: Emma Rangel   YOB: 2000   Date of Visit: 7/21/2023       Dear Dr. Mariajose Coreas: Thank you for referring Naldo Estesly to me for evaluation. Below are my notes for this consultation. If you have questions, please do not hesitate to call me. I look forward to following your patient along with you. Sincerely,        Elisa Dawn MD        CC: No Recipients    Elisa Dawn MD  7/21/2023  1:20 PM  Sign when Signing Visit  Assessment/Plan: Please see HPI. Discussed excision of the subcutaneous mass/lipoma of the chest/upper abdomen. She would also eventually like to discuss options for treatment of an unfavorable scar of the central chest, intermammary region. I explained her that it would not be wise to address this at the time of the lipoma excision. We will discuss this in further detail at another visit. I discussed how the procedure will be performed, as well as potential risk, complications and limitations. Her questions were answered to her satisfaction and consent has been obtained. She strongly would prefer to have this performed with general anesthesia. She will work with our coordinator to arrange a date for the procedure. There are no diagnoses linked to this encounter. Subjective: Lipoma    Patient ID: Emma Rangel is a 25 y.o. female. HPI Renate a pleasant 80-year-old female, referred by Maria Luisa Kwan PA-C for treatment of a lipoma of the chest/upper abdomen. Jenae Morganfield reports first noticing a prominence in this area a few months ago, it has enlarged somewhat in size and become uncomfortable such that it creates a "pressure" sensation due to its size and location. Recent ultrasound of the area involved (xiphoid) reveals a  4.9 x 3.6 x 2.1 cm mass, consistent with a lipoma.     The following portions of the patient's history were reviewed and updated as appropriate: allergies, current medications, past family history, past medical history, past social history, past surgical history and problem list.    Review of Systems   Constitutional: Negative for chills and fever. HENT: Negative for hearing loss. Eyes: Negative for discharge and visual disturbance. Respiratory: Negative for chest tightness and shortness of breath. Cardiovascular: Negative for chest pain and leg swelling. Gastrointestinal: Negative for blood in stool, constipation, diarrhea and nausea. Genitourinary: Negative for dysuria. Musculoskeletal: Negative for gait problem. Skin: Negative for rash. Allergic/Immunologic: Negative for immunocompromised state. Neurological: Negative for seizures and headaches. Hematological: Does not bruise/bleed easily. Psychiatric/Behavioral: Negative for dysphoric mood. The patient is not nervous/anxious. Objective:      Ht 5' 5.5" (1.664 m)   Wt 84.8 kg (187 lb)   BMI 30.65 kg/m²         Physical Exam  HENT:      Head: Normocephalic and atraumatic. Eyes:      Extraocular Movements: Extraocular movements intact. Pupils: Pupils are equal, round, and reactive to light. Cardiovascular:      Rate and Rhythm: Normal rate. Pulmonary:      Effort: Pulmonary effort is normal.   Abdominal:      Palpations: Abdomen is soft. Musculoskeletal:         General: Normal range of motion. Cervical back: Normal range of motion and neck supple. Skin:     General: Skin is warm. Comments: Visible, palpable, soft subcutaneous mass xiphoid region, nontender, approximately 5 cm x 4 cm, see photo, see ultrasound result   Neurological:      Mental Status: She is alert and oriented to person, place, and time.    Psychiatric:         Mood and Affect: Mood normal.

## 2023-08-08 LAB — HBA1C MFR BLD HPLC: 5.4 %

## 2023-08-10 ENCOUNTER — PREP FOR PROCEDURE (OUTPATIENT)
Dept: PLASTIC SURGERY | Facility: CLINIC | Age: 23
End: 2023-08-10

## 2023-08-10 DIAGNOSIS — D17.1 LIPOMA OF SKIN AND SUBCUTANEOUS TISSUE OF TRUNK: Primary | ICD-10-CM

## 2023-08-14 PROCEDURE — NC001 PR NO CHARGE: Performed by: PHYSICIAN ASSISTANT

## 2023-08-14 NOTE — H&P
Assessment/Plan: Please see HPI. Discussed excision of the subcutaneous mass/lipoma of the chest/upper abdomen. She would also eventually like to discuss options for treatment of an unfavorable scar of the central chest, intermammary region. I explained her that it would not be wise to address this at the time of the lipoma excision. We will discuss this in further detail at another visit. I discussed how the procedure will be performed, as well as potential risk, complications and limitations. Her questions were answered to her satisfaction and consent has been obtained. She strongly would prefer to have this performed with general anesthesia. She will work with our coordinator to arrange a date for the procedure. There are no diagnoses linked to this encounter. Subjective: Lipoma     Patient ID: Mere Otoole is a 25 y.o. female. MILLY Dewitt a pleasant 80-year-old female, referred by Tab Burch PA-C for treatment of a lipoma of the chest/upper abdomen. Chanellbrandon Barron reports first noticing a prominence in this area a few months ago, it has enlarged somewhat in size and become uncomfortable such that it creates a "pressure" sensation due to its size and location. Recent ultrasound of the area involved (xiphoid) reveals a  4.9 x 3.6 x 2.1 cm mass, consistent with a lipoma. The following portions of the patient's history were reviewed and updated as appropriate: allergies, current medications, past family history, past medical history, past social history, past surgical history and problem list.    Review of Systems   Constitutional: Negative for chills and fever. HENT: Negative for hearing loss. Eyes: Negative for discharge and visual disturbance. Respiratory: Negative for chest tightness and shortness of breath. Cardiovascular: Negative for chest pain and leg swelling. Gastrointestinal: Negative for blood in stool, constipation, diarrhea and nausea.    Genitourinary: Negative for dysuria. Musculoskeletal: Negative for gait problem. Skin: Negative for rash. Allergic/Immunologic: Negative for immunocompromised state. Neurological: Negative for seizures and headaches. Hematological: Does not bruise/bleed easily. Psychiatric/Behavioral: Negative for dysphoric mood. The patient is not nervous/anxious. Objective: There were no vitals taken for this visit. Physical Exam  HENT:      Head: Normocephalic and atraumatic. Eyes:      Extraocular Movements: Extraocular movements intact. Pupils: Pupils are equal, round, and reactive to light. Cardiovascular:      Rate and Rhythm: Normal rate. Pulmonary:      Effort: Pulmonary effort is normal.   Abdominal:      Palpations: Abdomen is soft. Musculoskeletal:         General: Normal range of motion. Cervical back: Normal range of motion and neck supple. Skin:     General: Skin is warm. Comments: Visible, palpable, soft subcutaneous mass xiphoid region, nontender, approximately 5 cm x 4 cm, see photo, see ultrasound result   Neurological:      Mental Status: She is alert and oriented to person, place, and time.    Psychiatric:         Mood and Affect: Mood normal.

## 2023-08-15 ENCOUNTER — ANESTHESIA EVENT (OUTPATIENT)
Dept: PERIOP | Facility: AMBULARY SURGERY CENTER | Age: 23
End: 2023-08-15
Payer: COMMERCIAL

## 2023-08-21 NOTE — ANESTHESIA PREPROCEDURE EVALUATION
Procedure:  EXCISION OF SUBCUTANEOUS MASS/LIPOMA OF CHEST/UPPER ABDOMEN (Chest)    Relevant Problems   ANESTHESIA (within normal limits)      CARDIO (within normal limits)      ENDO (within normal limits)      GI/HEPATIC (within normal limits)      /RENAL (within normal limits)      HEMATOLOGY (within normal limits)      NEURO/PSYCH (within normal limits)      PULMONARY   (+) Exercise-induced asthma      TTE 5/2019:  LEFT VENTRICLE:  Systolic function was normal by visual assessment. Ejection fraction was estimated to be 55 %. There were no regional wall motion abnormalities.     MITRAL VALVE:  There was trace regurgitation.     TRICUSPID VALVE:  There was trace regurgitation.     PULMONIC VALVE:  There was trace regurgitation. Lab Results   Component Value Date    WBC 12.51 (H) 10/27/2020    HGB 12.3 10/27/2020    HCT 39.0 10/27/2020    MCV 95 10/27/2020     10/27/2020     Lab Results   Component Value Date    SODIUM 142 10/27/2020    K 3.4 (L) 11/30/2020     (H) 10/27/2020    CO2 26 10/27/2020    BUN 7 10/27/2020    CREATININE 0.60 10/27/2020    GLUC 81 10/27/2020    CALCIUM 8.7 10/27/2020     Lab Results   Component Value Date    INR 1.08 05/09/2019    PROTIME 13.4 05/09/2019     No results found for: "HGBA1C"       Physical Exam    Airway    Mallampati score: I  TM Distance: >3 FB  Neck ROM: full     Dental   No notable dental hx     Cardiovascular  Cardiovascular exam normal    Pulmonary  Pulmonary exam normal     Other Findings        Anesthesia Plan  ASA Score- 2     Anesthesia Type- general with ASA Monitors. Additional Monitors:   Airway Plan: LMA. Plan Factors-Exercise tolerance (METS): >4 METS. Chart reviewed. Existing labs reviewed. Patient summary reviewed. Induction- intravenous. Postoperative Plan-     Informed Consent- Anesthetic plan and risks discussed with patient. I personally reviewed this patient with the CRNA.  Discussed and agreed on the Anesthesia Plan with the CRNA. Jayshree Montero

## 2023-08-22 ENCOUNTER — ANESTHESIA (OUTPATIENT)
Dept: PERIOP | Facility: AMBULARY SURGERY CENTER | Age: 23
End: 2023-08-22
Payer: COMMERCIAL

## 2023-08-22 ENCOUNTER — HOSPITAL ENCOUNTER (OUTPATIENT)
Facility: AMBULARY SURGERY CENTER | Age: 23
Setting detail: OUTPATIENT SURGERY
Discharge: HOME | End: 2023-08-22
Attending: SURGERY | Admitting: SURGERY
Payer: COMMERCIAL

## 2023-08-22 VITALS
BODY MASS INDEX: 30.22 KG/M2 | DIASTOLIC BLOOD PRESSURE: 55 MMHG | TEMPERATURE: 97.2 F | WEIGHT: 188 LBS | OXYGEN SATURATION: 97 % | SYSTOLIC BLOOD PRESSURE: 116 MMHG | HEIGHT: 66 IN | HEART RATE: 74 BPM | RESPIRATION RATE: 18 BRPM

## 2023-08-22 DIAGNOSIS — D17.1 LIPOMA OF SKIN AND SUBCUTANEOUS TISSUE OF TRUNK: ICD-10-CM

## 2023-08-22 LAB
EXT PREGNANCY TEST URINE: NEGATIVE
EXT. CONTROL: NORMAL

## 2023-08-22 PROCEDURE — 81025 URINE PREGNANCY TEST: CPT | Performed by: STUDENT IN AN ORGANIZED HEALTH CARE EDUCATION/TRAINING PROGRAM

## 2023-08-22 PROCEDURE — 88304 TISSUE EXAM BY PATHOLOGIST: CPT | Performed by: PATHOLOGY

## 2023-08-22 PROCEDURE — 21552 EXC NECK LES SC 3 CM/>: CPT | Performed by: SURGERY

## 2023-08-22 RX ORDER — ONDANSETRON 2 MG/ML
INJECTION INTRAMUSCULAR; INTRAVENOUS AS NEEDED
Status: DISCONTINUED | OUTPATIENT
Start: 2023-08-22 | End: 2023-08-22

## 2023-08-22 RX ORDER — MAGNESIUM HYDROXIDE 1200 MG/15ML
LIQUID ORAL AS NEEDED
Status: DISCONTINUED | OUTPATIENT
Start: 2023-08-22 | End: 2023-08-22 | Stop reason: HOSPADM

## 2023-08-22 RX ORDER — METOCLOPRAMIDE HYDROCHLORIDE 5 MG/ML
10 INJECTION INTRAMUSCULAR; INTRAVENOUS ONCE AS NEEDED
Status: DISCONTINUED | OUTPATIENT
Start: 2023-08-22 | End: 2023-08-22 | Stop reason: HOSPADM

## 2023-08-22 RX ORDER — MIDAZOLAM HYDROCHLORIDE 2 MG/2ML
INJECTION, SOLUTION INTRAMUSCULAR; INTRAVENOUS AS NEEDED
Status: DISCONTINUED | OUTPATIENT
Start: 2023-08-22 | End: 2023-08-22

## 2023-08-22 RX ORDER — EPHEDRINE SULFATE 50 MG/ML
INJECTION INTRAVENOUS AS NEEDED
Status: DISCONTINUED | OUTPATIENT
Start: 2023-08-22 | End: 2023-08-22

## 2023-08-22 RX ORDER — FENTANYL CITRATE/PF 50 MCG/ML
25 SYRINGE (ML) INJECTION
Status: DISCONTINUED | OUTPATIENT
Start: 2023-08-22 | End: 2023-08-22 | Stop reason: HOSPADM

## 2023-08-22 RX ORDER — SODIUM CHLORIDE, SODIUM LACTATE, POTASSIUM CHLORIDE, CALCIUM CHLORIDE 600; 310; 30; 20 MG/100ML; MG/100ML; MG/100ML; MG/100ML
INJECTION, SOLUTION INTRAVENOUS CONTINUOUS PRN
Status: DISCONTINUED | OUTPATIENT
Start: 2023-08-22 | End: 2023-08-22

## 2023-08-22 RX ORDER — PROPOFOL 10 MG/ML
INJECTION, EMULSION INTRAVENOUS AS NEEDED
Status: DISCONTINUED | OUTPATIENT
Start: 2023-08-22 | End: 2023-08-22

## 2023-08-22 RX ORDER — ONDANSETRON 2 MG/ML
4 INJECTION INTRAMUSCULAR; INTRAVENOUS ONCE AS NEEDED
Status: DISCONTINUED | OUTPATIENT
Start: 2023-08-22 | End: 2023-08-22 | Stop reason: HOSPADM

## 2023-08-22 RX ORDER — LIDOCAINE HYDROCHLORIDE 10 MG/ML
INJECTION, SOLUTION EPIDURAL; INFILTRATION; INTRACAUDAL; PERINEURAL AS NEEDED
Status: DISCONTINUED | OUTPATIENT
Start: 2023-08-22 | End: 2023-08-22

## 2023-08-22 RX ORDER — FENTANYL CITRATE 50 UG/ML
INJECTION, SOLUTION INTRAMUSCULAR; INTRAVENOUS AS NEEDED
Status: DISCONTINUED | OUTPATIENT
Start: 2023-08-22 | End: 2023-08-22

## 2023-08-22 RX ORDER — LIDOCAINE HYDROCHLORIDE AND EPINEPHRINE 10; 10 MG/ML; UG/ML
INJECTION, SOLUTION INFILTRATION; PERINEURAL AS NEEDED
Status: DISCONTINUED | OUTPATIENT
Start: 2023-08-22 | End: 2023-08-22 | Stop reason: HOSPADM

## 2023-08-22 RX ORDER — DEXAMETHASONE SODIUM PHOSPHATE 10 MG/ML
INJECTION, SOLUTION INTRAMUSCULAR; INTRAVENOUS AS NEEDED
Status: DISCONTINUED | OUTPATIENT
Start: 2023-08-22 | End: 2023-08-22

## 2023-08-22 RX ORDER — CEFAZOLIN SODIUM 2 G/50ML
2000 SOLUTION INTRAVENOUS ONCE
Status: COMPLETED | OUTPATIENT
Start: 2023-08-22 | End: 2023-08-22

## 2023-08-22 RX ADMIN — CEFAZOLIN SODIUM 2000 MG: 2 SOLUTION INTRAVENOUS at 16:02

## 2023-08-22 RX ADMIN — EPHEDRINE SULFATE 5 MG: 50 INJECTION INTRAVENOUS at 16:05

## 2023-08-22 RX ADMIN — EPHEDRINE SULFATE 5 MG: 50 INJECTION INTRAVENOUS at 16:22

## 2023-08-22 RX ADMIN — DEXAMETHASONE SODIUM PHOSPHATE 10 MG: 10 INJECTION, SOLUTION INTRAMUSCULAR; INTRAVENOUS at 15:55

## 2023-08-22 RX ADMIN — LIDOCAINE HYDROCHLORIDE 50 MG: 10 INJECTION, SOLUTION EPIDURAL; INFILTRATION; INTRACAUDAL at 15:55

## 2023-08-22 RX ADMIN — FENTANYL CITRATE 25 MCG: 50 INJECTION INTRAMUSCULAR; INTRAVENOUS at 15:58

## 2023-08-22 RX ADMIN — PROPOFOL 200 MG: 10 INJECTION, EMULSION INTRAVENOUS at 15:55

## 2023-08-22 RX ADMIN — MIDAZOLAM 2 MG: 1 INJECTION INTRAMUSCULAR; INTRAVENOUS at 15:50

## 2023-08-22 RX ADMIN — SODIUM CHLORIDE, SODIUM LACTATE, POTASSIUM CHLORIDE, AND CALCIUM CHLORIDE: .6; .31; .03; .02 INJECTION, SOLUTION INTRAVENOUS at 15:52

## 2023-08-22 RX ADMIN — ONDANSETRON 4 MG: 2 INJECTION INTRAMUSCULAR; INTRAVENOUS at 15:55

## 2023-08-22 NOTE — ANESTHESIA POSTPROCEDURE EVALUATION
Post-Op Assessment Note    CV Status:  Stable    Pain management: adequate     Mental Status:  Alert and awake   Hydration Status:  Euvolemic   PONV Controlled:  Controlled   Airway Patency:  Patent      Post Op Vitals Reviewed: Yes      Staff: CRNA         No notable events documented.     BP   119/61   Temp (!) 97.4 °F (36.3 °C) (08/22/23 1634)    Pulse 93   Resp   12   SpO2   100

## 2023-08-22 NOTE — INTERVAL H&P NOTE
H&P reviewed. After examining the patient I find no changes in the patients condition since the H&P had been written.     Vitals:    08/22/23 1349   BP: 111/56   Pulse: 66   Resp: 18   Temp: (!) 97.1 °F (36.2 °C)   SpO2: 98%

## 2023-08-22 NOTE — DISCHARGE INSTR - AVS FIRST PAGE
[facility]  Dr. Melissa Pal.  [address], [place]  Phone: [phone]     Postoperative Instructions for Outpatient Surgery     These instructions are being provided by your doctor to give you basic guidelines during your post-op recovery. Please let our office know if your contact information has changed. Please call the office today for an appointment in 10-14 days for postoperative care/suture removal.      Dressings: Please leave dressing clean, dry, and intact for 48 hours. Once dressing removed, leave steri-strips intact over incision site. Activity Restrictions: No strenuous activity. Bathing: May shower in 48 hours, after outer dressing is removed. Medications:    Resume pre-op medications.    You may take Tylenol, Aleve, or Ibuprofen for pain control

## 2023-08-22 NOTE — OP NOTE
OPERATIVE REPORT  PATIENT NAME: Maria Eugenia Franklin    :  2000  MRN: [id number]  Pt Location: [facility] 04    SURGERY DATE: 2023    Surgeon(s) and Role:     Kelsey Hayes MD - Primary  Rebekah Ramesh D.P.M. assist    Preop Diagnosis:  Subcutaneous mass/lipoma of the chest    Postoperative diagnosis: Subcutaneous mass/lipoma of the chest  Procedure(s):  EXCISION OF SUBCUTANEOUS MASS/LIPOMA OF CHEST/UPPER ABDOMEN 4.5 cm, intermediate/layered closure of the chest 2.8 cm  Specimen(s):  ID Type Source Tests Collected by Time Destination   1 : subcutaneous mass/lipoma central chest Tissue Soft Tissue, Other TISSUE Sumit Hampton MD 2023 1612        Estimated Blood Loss:   Minimal    Drains:  * No LDAs found *    Anesthesia Type:   General    Operative Indications:    Subcutaneous mass/lipoma of chest    Operative Findings:  As above    Complications:   None    Procedure and Technique:  Marcus Richter was seen preoperatively in the holding area, surgical site was marked with her participation, and I reviewed with her the planned procedure, potential risks, complications, and limitations. She was taken to the operating room and underwent induction of general anesthesia by the anesthesia personnel. The operative field was prepped and draped in sterile fashion, a proper timeout was performed. 2.5 loupe magnification was used to aid in visualization. The previously marked area that was planned for the skin incision to coincide with a natural skin crease, was infiltrated with lidocaine with epinephrine. A 15 blade was then used to create skin incisions carried down through the dermis, and a combination of spreading and cutting with tenotomy scissors was used to enter the deep subcutaneous plane. A multilobulated lipoma was extracted in a piecemeal fashion in order to minimize the length of the incision/scar.   After it had been completely removed the wound was thoroughly irrigated and hemostasis assured. Closure was then accomplished in layers and 4-0 PDS was used in an inverted, buried fashion to close the deep dermis, this was followed by running subcuticular 4-0 PDS. Benzoin Steri-Strips and a light pressure dressing were applied and the patient was transferred to the recovery room. I was present for the entire procedure.     Patient Disposition:  PACU         SIGNATURE: Tomas Mcduffie MD  DATE: August 22, 2023  TIME: 4:37 PM

## 2023-08-24 ENCOUNTER — HOSPITAL ENCOUNTER (OUTPATIENT)
Dept: RADIOLOGY | Age: 23
Discharge: HOME | End: 2023-08-24
Payer: COMMERCIAL

## 2023-08-24 DIAGNOSIS — L70.9 SAPHO SYNDROME (HCC): ICD-10-CM

## 2023-08-24 DIAGNOSIS — N94.3 PMS (PREMENSTRUAL SYNDROME): ICD-10-CM

## 2023-08-24 DIAGNOSIS — K59.00 CONSTIPATION, UNSPECIFIED CONSTIPATION TYPE: ICD-10-CM

## 2023-08-24 DIAGNOSIS — M65.9 SAPHO SYNDROME (HCC): ICD-10-CM

## 2023-08-24 DIAGNOSIS — M85.80 SAPHO SYNDROME (HCC): ICD-10-CM

## 2023-08-24 DIAGNOSIS — M86.9 SAPHO SYNDROME (HCC): ICD-10-CM

## 2023-08-24 DIAGNOSIS — L40.3 SAPHO SYNDROME (HCC): ICD-10-CM

## 2023-08-24 DIAGNOSIS — N94.6 DYSMENORRHEA: ICD-10-CM

## 2023-08-24 DIAGNOSIS — R14.0 GASTRIC TYMPANY: ICD-10-CM

## 2023-08-24 PROCEDURE — 76856 US EXAM PELVIC COMPLETE: CPT

## 2023-08-24 PROCEDURE — 76830 TRANSVAGINAL US NON-OB: CPT

## 2023-08-25 ENCOUNTER — TELEPHONE (OUTPATIENT)
Dept: PLASTIC SURGERY | Facility: CLINIC | Age: 23
End: 2023-08-25

## 2023-08-25 NOTE — TELEPHONE ENCOUNTER
Attempted to call patient to se how she is doing since surgery, no answer.  Left a message to call me back

## 2023-08-28 PROCEDURE — 88304 TISSUE EXAM BY PATHOLOGIST: CPT | Performed by: PATHOLOGY

## 2023-09-08 ENCOUNTER — OFFICE VISIT (OUTPATIENT)
Dept: PLASTIC SURGERY | Facility: HOSPITAL | Age: 23
End: 2023-09-08

## 2023-09-08 DIAGNOSIS — D17.1 LIPOMA OF TORSO: Primary | ICD-10-CM

## 2023-09-08 NOTE — PROGRESS NOTES
Assessment/Plan:     Diagnoses and all orders for this visit:    Lipoma of torso  Pt is here for a post-op visit. She underwent excision of a subcutaneous mass of the chest/ upper abdomen on 8/22 by Dr. Nubia Mahoney. Pathology consistent with lipoma. She complains of some swelling at the incision. Incision is C/D/I. Sutures were removed. We discussed scar care & massage. We will see her back in 1 month for a scar check. Subjective:      Patient ID: Kathy Goldman is a 25 y.o. female. HPI     Pt is here for a post-op visit. She underwent excision of a subcutaneous mass of the chest/ upper abdomen on 8/22 by Dr. Nubia Mahoney. Pathology consistent with lipoma. She complains of some swelling at the incision. Patient Active Problem List   Diagnosis   • Exercise-induced asthma     Allergies   Allergen Reactions   • Oxycodone Hives     Current Outpatient Medications on File Prior to Visit   Medication Sig   • albuterol (PROVENTIL HFA,VENTOLIN HFA) 90 mcg/act inhaler Inhale 2 puffs every 6 (six) hours as needed for wheezing     No current facility-administered medications on file prior to visit.      Family History   Problem Relation Age of Onset   • Asthma Mother    • Hyperlipidemia Mother    • BRUNO disease Mother    • Heart disease Father    • Hyperlipidemia Father    • BRUNO disease Father    • Stroke Maternal Grandmother    • Hypertension Maternal Grandmother    • Breast cancer Maternal Grandmother    • Diabetes Maternal Grandmother    • Heart disease Maternal Grandmother    • Hyperlipidemia Maternal Grandmother    • Heart disease Maternal Grandfather    • Hyperlipidemia Maternal Grandfather    • Heart disease Paternal Grandmother    • Heart disease Paternal Grandfather    • Drug abuse Neg Hx    • Mental illness Neg Hx      Past Medical History:   Diagnosis Date   • Acne    • Allergic    • Asthma    • Left buttock abscess 10/06/2017   • Neoplasm of uncertain behavior 19/80/6937    of skin     Social History Socioeconomic History   • Marital status: Single     Spouse name: Not on file   • Number of children: Not on file   • Years of education: Not on file   • Highest education level: Not on file   Occupational History   • Not on file   Tobacco Use   • Smoking status: Never   • Smokeless tobacco: Never   Vaping Use   • Vaping Use: Never used   Substance and Sexual Activity   • Alcohol use: Never   • Drug use: Never   • Sexual activity: Not Currently   Other Topics Concern   • Not on file   Social History Narrative    ** Merged History Encounter **         Always uses seatbelts  Daily cola consumption-1 can per day  Has smoke detectors       Social Determinants of Health     Financial Resource Strain: Not on file   Food Insecurity: Not on file   Transportation Needs: Not on file   Physical Activity: Not on file   Stress: Not on file   Social Connections: Not on file   Intimate Partner Violence: Not on file   Housing Stability: Not on file     Past Surgical History:   Procedure Laterality Date   • ABSCESS DRAINAGE Left 10/06/2017    I & D OF LEFT BUTTOCK ABSCESS IN OFFICE-ANDREA HARRELL PA-C   • IN ARTHRS KNE SURG W/MENISCECTOMY MED/LAT W/SHVG Right 2/1/2019    Procedure: ARTHROSCOPY KNEE, RIGHT RESECTION OF ANTERIOMEDIAL PLICA;  Surgeon: Kavon Fletcher MD;  Location:  MAIN OR;  Service: Orthopedics   • IN EXC TUMOR SOFT TISS NECK/THORAX SUBFASCIAL <5CM N/A 8/22/2023    Procedure: EXCISION OF SUBCUTANEOUS MASS/LIPOMA OF CHEST/UPPER ABDOMEN;  Surgeon: Kevin Neil MD;  [facility] MA[facility];  Service: Plastics         Review of Systems   All other systems reviewed and are negative. Objective: There were no vitals taken for this visit. Physical Exam  Constitutional:       Appearance: Normal appearance. She is well-developed. HENT:      Head: Normocephalic and atraumatic.    Eyes:      Conjunctiva/sclera: Conjunctivae normal.   Pulmonary:      Effort: Pulmonary effort is normal. Comments: Incision is C/D/I; sutures were removed. Musculoskeletal:         General: Normal range of motion. Cervical back: Normal range of motion. Skin:     General: Skin is warm and dry. Neurological:      Mental Status: She is alert and oriented to person, place, and time.    Psychiatric:         Mood and Affect: Mood normal.         Behavior: Behavior normal.

## 2023-10-12 ENCOUNTER — TELEPHONE (OUTPATIENT)
Age: 23
End: 2023-10-12

## 2023-10-12 ENCOUNTER — TELEPHONE (OUTPATIENT)
Dept: GASTROENTEROLOGY | Facility: CLINIC | Age: 23
End: 2023-10-12

## 2023-10-12 ENCOUNTER — OFFICE VISIT (OUTPATIENT)
Dept: GASTROENTEROLOGY | Facility: CLINIC | Age: 23
End: 2023-10-12
Payer: COMMERCIAL

## 2023-10-12 VITALS
BODY MASS INDEX: 30.37 KG/M2 | HEIGHT: 66 IN | DIASTOLIC BLOOD PRESSURE: 60 MMHG | WEIGHT: 189 LBS | SYSTOLIC BLOOD PRESSURE: 100 MMHG

## 2023-10-12 DIAGNOSIS — K90.0 CELIAC DISEASE: Primary | ICD-10-CM

## 2023-10-12 PROCEDURE — 99204 OFFICE O/P NEW MOD 45 MIN: CPT | Performed by: INTERNAL MEDICINE

## 2023-10-12 RX ORDER — PROGESTERONE 100 MG/1
1 CAPSULE ORAL
COMMUNITY
Start: 2023-09-25

## 2023-10-12 RX ORDER — SPIRONOLACTONE 50 MG/1
50 TABLET, FILM COATED ORAL DAILY
COMMUNITY
Start: 2023-09-25

## 2023-10-12 NOTE — PATIENT INSTRUCTIONS
Scheduled date of EGD(as of today): 11/14/23  Physician performing EGD: Dr. PAN [facility]  Location of EGD: [facility]  Instructions reviewed with patient by: Olimpia  Clearances: n/a

## 2023-10-12 NOTE — PROGRESS NOTES
[address] [place] [address] [facility] - Outpatient Consultation  Iglesia Hurst 25 y.o. female MRN: [id number]  Encounter: [id number]    ASSESSMENT AND PLAN:      1. Celiac disease  22F with long history of post prandial bloating, recently diagnosed with celiac disease (+ serologies), here today at the request of her wellness center for further follow up. - Labs reviewed. + serologies. Normal CBC and CMP. No evidence of iron deficiency. - Strict gluten free diet. Monitor for contamination.  - Annual blood work  - Check EGD at this time  - Annual derm exam    - EGD; Future      Followup Appointment: 1 year  ______________________________________________________________________    Chief Complaint   Patient presents with   • Celiac Disease     Pt has recently been diagnosed with Celiac and was referred by [facility], wants to know next steps       HPI:   Iglesia Hurst is a 25y.o. year old female who presents [facility] request of [facility] for positive celiac serologies. Pt states that she has had bloating post prandially for years with acne. Her doctor at the wellness center ordered blood work which showed + serologies for celiac. All the antibodies were elevated. She has since gone gluten free with improvement in bloating and even her acne. Denies sig weight loss, GIB, abd pains. No n/v. No diarrhea.      Historical Information   Past Medical History:   Diagnosis Date   • Acne    • Allergic    • Asthma    • Celiac disease    • Left buttock abscess 10/06/2017   • Neoplasm of uncertain behavior 02/14/3593    of skin     Past Surgical History:   Procedure Laterality Date   • ABSCESS DRAINAGE Left 10/06/2017    I & D OF LEFT BUTTOCK ABSCESS IN OFFICE-ANDREA HARRELL PA-C   • MS ARTHRS KNE SURG W/MENISCECTOMY MED/LAT W/SHVG Right 2/1/2019    Procedure: ARTHROSCOPY KNEE, RIGHT RESECTION OF ANTERIOMEDIAL PLICA;  Surgeon: Carmelo Orellana MD;  Location: [facility];  Service: Orthopedics   • NC EXC TUMOR SOFT TISS NECK/THORAX SUBFASCIAL <5CM N/A 8/22/2023    Procedure: EXCISION OF SUBCUTANEOUS MASS/LIPOMA OF CHEST/UPPER ABDOMEN;  Surgeon: Felipe Vee MD;  [facility];  Service: Plastics     Social History     Substance and Sexual Activity   Alcohol Use Never     Social History     Substance and Sexual Activity   Drug Use Never     Social History     Tobacco Use   Smoking Status Never   Smokeless Tobacco Never     Family History   Problem Relation Age of Onset   • Asthma Mother    • Hyperlipidemia Mother    • BRUNO disease Mother    • Heart disease Father    • Hyperlipidemia Father    • BRUNO disease Father    • Stroke Maternal Grandmother    • Hypertension Maternal Grandmother    • Breast cancer Maternal Grandmother    • Diabetes Maternal Grandmother    • Heart disease Maternal Grandmother    • Hyperlipidemia Maternal Grandmother    • Heart disease Maternal Grandfather    • Hyperlipidemia Maternal Grandfather    • Heart disease Paternal Grandmother    • Heart disease Paternal Grandfather    • Drug abuse Neg Hx    • Mental illness Neg Hx    • Colon cancer Neg Hx    • Colon polyps Neg Hx    • Celiac disease Neg Hx    • Inflammatory bowel disease Neg Hx        Meds/Allergies     Current Outpatient Medications:   •  albuterol (PROVENTIL HFA,VENTOLIN HFA) 90 mcg/act inhaler  •  Progesterone 100 MG CAPS  •  spironolactone (ALDACTONE) 50 mg tablet    Allergies   Allergen Reactions   • Oxycodone Hives       PHYSICAL EXAM:    Blood pressure 100/60, height 5' 6" (1.676 m), weight 85.7 kg (189 lb). Body mass index is 30.51 kg/m². General Appearance: NAD, cooperative, alert  Eyes: Anicteric, PERRLA, EOMI  ENT:  Normocephalic, atraumatic, normal mucosa.     Neck:  Supple, symmetrical, trachea midline,   Resp:  Clear to auscultation bilaterally; no rales, rhonchi or wheezing; respirations unlabored   CV:  S1 S2, Regular rate and rhythm; no murmur, rub, or gallop. GI:  Soft, non-tender, non-distended; normal bowel sounds; no masses, no organomegaly   Rectal: Deferred  Musculoskeletal: No cyanosis, clubbing or edema. Normal ROM. Skin:  No jaundice, rashes, or lesions   Heme/Lymph: No palpable cervical lymphadenopathy  Psych: Normal affect, good eye contact  Neuro: No gross deficits, AAOx3    Lab Results:   Lab Results   Component Value Date    WBC 12.51 (H) 10/27/2020    HGB 12.3 10/27/2020    HCT 39.0 10/27/2020    MCV 95 10/27/2020     10/27/2020     Lab Results   Component Value Date    K 3.4 (L) 11/30/2020     (H) 10/27/2020    CO2 26 10/27/2020    BUN 7 10/27/2020    CREATININE 0.60 10/27/2020    GLUCOSE 98 10/26/2020    CALCIUM 8.7 10/27/2020    AST 18 05/10/2019    ALT 21 05/10/2019    ALKPHOS 76 05/10/2019    EGFR 133 10/27/2020     No results found for: "IRON", "TIBC", "FERRITIN"  No results found for: "LIPASE"    Radiology Results:   No results found. REVIEW OF SYSTEMS:    CONSTITUTIONAL: Denies any fever, chills, rigors, and weight loss. HEENT: No earache or tinnitus. Denies hearing loss or visual disturbances. CARDIOVASCULAR: No chest pain or palpitations. RESPIRATORY: Denies any cough, hemoptysis, shortness of breath or dyspnea on exertion. GASTROINTESTINAL: As noted in the History of Present Illness. GENITOURINARY: No problems with urination. Denies any hematuria or dysuria. NEUROLOGIC: No dizziness or vertigo, denies headaches. MUSCULOSKELETAL: Denies any muscle or joint pain. SKIN: Denies skin rashes or itching. ENDOCRINE: Denies excessive thirst. Denies intolerance to heat or cold. PSYCHOSOCIAL: Denies depression or anxiety. Denies any recent memory loss.

## 2023-10-12 NOTE — TELEPHONE ENCOUNTER
Patients GI provider:  Dr. PAN [facility]    Number to return call: N/A    Reason for call: [facility] called with their fax number.  Dr Maria Wilkins Fax [phone]     Scheduled procedure/appointment date if applicable: procedure 77/93/7041

## 2023-10-18 ENCOUNTER — OFFICE VISIT (OUTPATIENT)
Dept: FAMILY MEDICINE CLINIC | Facility: CLINIC | Age: 23
End: 2023-10-18
Payer: COMMERCIAL

## 2023-10-18 VITALS
WEIGHT: 196 LBS | OXYGEN SATURATION: 98 % | HEART RATE: 66 BPM | RESPIRATION RATE: 16 BRPM | HEIGHT: 66 IN | BODY MASS INDEX: 31.5 KG/M2 | SYSTOLIC BLOOD PRESSURE: 104 MMHG | TEMPERATURE: 97.5 F | DIASTOLIC BLOOD PRESSURE: 70 MMHG

## 2023-10-18 DIAGNOSIS — J45.990 EXERCISE-INDUCED ASTHMA: ICD-10-CM

## 2023-10-18 DIAGNOSIS — Z00.00 ANNUAL PHYSICAL EXAM: Primary | ICD-10-CM

## 2023-10-18 PROCEDURE — 99395 PREV VISIT EST AGE 18-39: CPT | Performed by: PHYSICIAN ASSISTANT

## 2023-10-18 RX ORDER — ALBUTEROL SULFATE 90 UG/1
2 AEROSOL, METERED RESPIRATORY (INHALATION) EVERY 6 HOURS PRN
Qty: 18 G | Refills: 1 | Status: SHIPPED | OUTPATIENT
Start: 2023-10-18

## 2023-10-18 NOTE — PATIENT INSTRUCTIONS
Wellness Visit for Adults   AMBULATORY CARE:   A wellness visit  is when you see your healthcare provider to get screened for health problems. Your healthcare provider will also give you advice on how to stay healthy. Write down your questions so you remember to ask them. Ask your healthcare provider how often you should have a wellness visit. What happens at a wellness visit:  Your healthcare provider will ask about your health, and your family history of health problems. This includes high blood pressure, heart disease, and cancer. He or she will ask if you have symptoms that concern you, if you smoke, and about your mood. You may also be asked about your intake of medicines, supplements, food, and alcohol. Any of the following may be done: Your weight  will be checked. Your height may also be checked so your body mass index (BMI) can be calculated. Your BMI shows if you are at a healthy weight. Your blood pressure  and heart rate will be checked. Your temperature may also be checked. Blood and urine tests  may be done. Blood tests may be done to check your cholesterol levels. Abnormal cholesterol levels increase your risk for heart disease and stroke. You may also need a blood or urine test to check for diabetes if you are at increased risk. Urine tests may be done to look for signs of an infection or kidney disease. A physical exam  includes checking your heartbeat and lungs with a stethoscope. Your healthcare provider may also check your skin to look for sun damage. Screening tests  may be recommended. A screening test is done to check for diseases that may not cause symptoms. The screening tests you may need depend on your age, gender, family history, and lifestyle habits. For example, colorectal screening may be recommended if you are 48years old or older. Screening tests you need if you are a woman:   A Pap smear  is used to screen for cervical cancer.  Pap smears are usually done every 3 to 5 years depending on your age. You may need them more often if you have had abnormal Pap smear test results in the past. Ask your healthcare provider how often you should have a Pap smear. A mammogram  is an x-ray of your breasts to screen for breast cancer. Experts recommend mammograms every 2 years starting at age 48 years. You may need a mammogram at age 52 years or younger if you have an increased risk for breast cancer. Talk to your healthcare provider about when you should start having mammograms and how often you need them. Vaccines you may need:   Get an influenza vaccine  every year. The influenza vaccine protects you from the flu. Several types of viruses cause the flu. The viruses change over time, so new vaccines are made each year. Get a tetanus-diphtheria (Td) booster vaccine  every 10 years. This vaccine protects you against tetanus and diphtheria. Tetanus is a severe infection that may cause painful muscle spasms and lockjaw. Diphtheria is a severe bacterial infection that causes a thick covering in the back of your mouth and throat. Get a human papillomavirus (HPV) vaccine  if you are female and aged 23 to 32 or male 23 to 24 and never received it. This vaccine protects you from HPV infection. HPV is the most common infection spread by sexual contact. HPV may also cause vaginal, penile, and anal cancers. Get a pneumococcal vaccine  if you are aged 72 years or older. The pneumococcal vaccine is an injection given to protect you from pneumococcal disease. Pneumococcal disease is an infection caused by pneumococcal bacteria. The infection may cause pneumonia, meningitis, or an ear infection. Get a shingles vaccine  if you are 60 or older, even if you have had shingles before. The shingles vaccine is an injection to protect you from the varicella-zoster virus. This is the same virus that causes chickenpox.  Shingles is a painful rash that develops in people who had chickenpox or have been exposed to the virus. How to eat healthy:  My Plate is a model for planning healthy meals. It shows the types and amounts of foods that should go on your plate. Fruits and vegetables make up about half of your plate, and grains and protein make up the other half. A serving of dairy is included on the side of your plate. The amount of calories and serving sizes you need depends on your age, gender, weight, and height. Examples of healthy foods are listed below:  Eat a variety of vegetables  such as dark green, red, and orange vegetables. You can also include canned vegetables low in sodium (salt) and frozen vegetables without added butter or sauces. Eat a variety of fresh fruits , canned fruit in 100% juice, frozen fruit, and dried fruit. Include whole grains. At least half of the grains you eat should be whole grains. Examples include whole-wheat bread, wheat pasta, brown rice, and whole-grain cereals such as oatmeal.    Eat a variety of protein foods such as seafood (fish and shellfish), lean meat, and poultry without skin (turkey and chicken). Examples of lean meats include pork leg, shoulder, or tenderloin, and beef round, sirloin, tenderloin, and extra lean ground beef. Other protein foods include eggs and egg substitutes, beans, peas, soy products, nuts, and seeds. Choose low-fat dairy products such as skim or 1% milk or low-fat yogurt, cheese, and cottage cheese. Limit unhealthy fats  such as butter, hard margarine, and shortening. Exercise:  Exercise at least 30 minutes per day on most days of the week. Some examples of exercise include walking, biking, dancing, and swimming. You can also fit in more physical activity by taking the stairs instead of the elevator or parking farther away from stores. Include muscle strengthening activities 2 days each week. Regular exercise provides many health benefits.  It helps you manage your weight, and decreases your risk for type 2 diabetes, heart disease, stroke, and high blood pressure. Exercise can also help improve your mood. Ask your healthcare provider about the best exercise plan for you. General health and safety guidelines:   Do not smoke. Nicotine and other chemicals in cigarettes and cigars can cause lung damage. Ask your healthcare provider for information if you currently smoke and need help to quit. E-cigarettes or smokeless tobacco still contain nicotine. Talk to your healthcare provider before you use these products. Limit alcohol. A drink of alcohol is 12 ounces of beer, 5 ounces of wine, or 1½ ounces of liquor. Lose weight, if needed. Being overweight increases your risk of certain health conditions. These include heart disease, high blood pressure, type 2 diabetes, and certain types of cancer. Protect your skin. Do not sunbathe or use tanning beds. Use sunscreen with a SPF 15 or higher. Apply sunscreen at least 15 minutes before you go outside. Reapply sunscreen every 2 hours. Wear protective clothing, hats, and sunglasses when you are outside. Drive safely. Always wear your seatbelt. Make sure everyone in your car wears a seatbelt. A seatbelt can save your life if you are in an accident. Do not use your cell phone when you are driving. This could distract you and cause an accident. Pull over if you need to make a call or send a text message. Practice safe sex. Use latex condoms if are sexually active and have more than one partner. Your healthcare provider may recommend screening tests for sexually transmitted infections (STIs). Wear helmets, lifejackets, and protective gear. Always wear a helmet when you ride a bike or motorcycle, go skiing, or play sports that could cause a head injury. Wear protective equipment when you play sports. Wear a lifejacket when you are on a boat or doing water sports.     © Copyright Luis Nunn 2023 Information is for End User's use only and may not be sold, redistributed or otherwise used for commercial purposes. The above information is an  only. It is not intended as medical advice for individual conditions or treatments. Talk to your doctor, nurse or pharmacist before following any medical regimen to see if it is safe and effective for you.

## 2023-10-18 NOTE — PROGRESS NOTES
ADULT ANNUAL PHYSICAL  [id number] [facility]    NAME: Margie Saucedo  AGE: 25 y.o. SEX: female  : 2000     DATE: 10/18/2023     Assessment and Plan:     Healthy 25year old female    Immunizations and preventive care screenings were discussed with patient today. Appropriate education was printed on patient's after visit summary. Counseling:  Alcohol/drug use: discussed moderation in alcohol intake, the recommendations for healthy alcohol use, and avoidance of illicit drug use. Dental Health: discussed importance of regular tooth brushing, flossing, and dental visits. Injury prevention: discussed safety/seat belts, safety helmets, smoke detectors, carbon dioxide detectors, and smoking near bedding or upholstery. Sexual health: discussed sexually transmitted diseases, partner selection, use of condoms, avoidance of unintended pregnancy, and contraceptive alternatives. Exercise: the importance of regular exercise/physical activity was discussed. Recommend exercise 3-5 times per week for at least 30 minutes. Depression Screening and Follow-up Plan: Patient was screened for depression during today's encounter. They screened negative with a PHQ-2 score of 0. Return in 1 year (on 10/18/2024). 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 Past covid infections. No allergic reaction to vaccinations in the past    Asthma     Needs refill of albuterol      History of Present Illness:     Adult Annual Physical   Patient here for a comprehensive physical exam. The patient reports no problems. Diet and Physical Activity  Diet/Nutrition: well balanced diet. Exercise: no formal exercise.       Depression Screening  PHQ-2/9 Depression Screening    Little interest or pleasure in doing things: 0 - not at all  Feeling down, depressed, or hopeless: 0 - not at all  PHQ-2 Score: 0  PHQ-2 Interpretation: Negative depression screen       General Health  Sleep: sleeps well. Hearing: normal - bilateral.  Vision: no vision problems. Dental: regular dental visits. /GYN Health  Last menstrual period: regular  Pap done 2022         Review of Systems:     Review of Systems   Constitutional: Negative. HENT: Negative. Eyes: Negative. Respiratory: Negative. Cardiovascular: Negative. Gastrointestinal: Negative. Endocrine: Negative. Genitourinary: Negative. Musculoskeletal: Negative. Skin: Negative. Allergic/Immunologic: Negative. Neurological: Negative. Hematological: Negative. Psychiatric/Behavioral: Negative.         Past Medical History:     Past Medical History:   Diagnosis Date    Acne     Allergic     Asthma     Celiac disease     Left buttock abscess 10/06/2017    Neoplasm of uncertain behavior 46/94/7852    of skin      Past Surgical History:     Past Surgical History:   Procedure Laterality Date    ABSCESS DRAINAGE Left 10/06/2017    I & D OF LEFT BUTTOCK ABSCESS IN OFFICE-ANDREA HARRELL PA-C    AR ARTHRS KNE SURG W/MENISCECTOMY MED/LAT W/SHVG Right 02/01/2019    Procedure: ARTHROSCOPY KNEE, RIGHT RESECTION OF ANTERIOMEDIAL PLICA;  Surgeon: Janene Meraz MD;  [facility];  Service: Orthopedics    AR EXC TUMOR SOFT TISS NECK/THORAX SUBFASCIAL <5CM N/A 08/22/2023    Procedure: EXCISION OF SUBCUTANEOUS MASS/LIPOMA OF CHEST/UPPER ABDOMEN;  Surgeon: Karyn Naqvi MD;  Location[facility];  Service: Plastics      Social History:     Social History     Socioeconomic History    Marital status: Single     Spouse name: None    Number of children: None    Years of education: None    Highest education level: None   Occupational History    None   Tobacco Use    Smoking status: Never    Smokeless tobacco: Never   Vaping Use    Vaping Use: Never used   Substance and Sexual Activity    Alcohol use: Never    Drug use: Never    Sexual activity: Not Currently   Other Topics Concern    None   Social History Narrative    ** Merged History Encounter **         Always uses seatbelts  Daily cola consumption-1 can per day  Has smoke detectors       Social Determinants of Health     Financial Resource Strain: Not on file   Food Insecurity: Not on file   Transportation Needs: Not on file   Physical Activity: Not on file   Stress: Not on file   Social Connections: Not on file   Intimate Partner Violence: Not on file   Housing Stability: Not on file      Family History:     Family History   Problem Relation Age of Onset    Asthma Mother     Hyperlipidemia Mother     BRUNO disease Mother     Heart disease Father     Hyperlipidemia Father     BRUNO disease Father     Stroke Maternal Grandmother     Hypertension Maternal Grandmother     Breast cancer Maternal Grandmother     Diabetes Maternal Grandmother     Heart disease Maternal Grandmother     Hyperlipidemia Maternal Grandmother     Heart disease Maternal Grandfather     Hyperlipidemia Maternal Grandfather     Heart disease Paternal Grandmother     Heart disease Paternal Grandfather     Drug abuse Neg Hx     Mental illness Neg Hx     Colon cancer Neg Hx     Colon polyps Neg Hx     Celiac disease Neg Hx     Inflammatory bowel disease Neg Hx       Current Medications:     Current Outpatient Medications   Medication Sig Dispense Refill    albuterol (PROVENTIL HFA,VENTOLIN HFA) 90 mcg/act inhaler Inhale 2 puffs every 6 (six) hours as needed for wheezing      Progesterone 100 MG CAPS Take 1 capsule by mouth daily at bedtime      spironolactone (ALDACTONE) 50 mg tablet Take 50 mg by mouth daily       No current facility-administered medications for this visit. Allergies:      Allergies   Allergen Reactions    Oxycodone Hives      Physical Exam:     /70   Pulse 66   Temp 97.5 °F (36.4 °C) (Temporal)   Resp 16 Ht 5' 6" (1.676 m)   Wt 88.9 kg (196 lb)   SpO2 98%   BMI 31.64 kg/m²     Physical Exam  Constitutional:       Appearance: Normal appearance. She is well-developed and normal weight. HENT:      Head: Normocephalic and atraumatic. Right Ear: Hearing normal.      Left Ear: Hearing normal.      Mouth/Throat:      Pharynx: Uvula midline. Eyes:      Extraocular Movements: Extraocular movements intact. Conjunctiva/sclera: Conjunctivae normal.      Pupils: Pupils are equal, round, and reactive to light. Neck:      Thyroid: No thyromegaly. Cardiovascular:      Rate and Rhythm: Normal rate and regular rhythm. Pulses: Normal pulses. Heart sounds: Normal heart sounds. No murmur heard. Pulmonary:      Effort: Pulmonary effort is normal.      Breath sounds: Normal breath sounds. Abdominal:      General: Abdomen is flat. Bowel sounds are normal. There is no distension. Palpations: Abdomen is soft. There is no mass. Tenderness: There is no abdominal tenderness. Musculoskeletal:         General: Normal range of motion. Cervical back: Normal range of motion and neck supple. Lymphadenopathy:      Cervical: No cervical adenopathy. Skin:     General: Skin is warm. Neurological:      General: No focal deficit present. Mental Status: She is alert and oriented to person, place, and time. Cranial Nerves: No cranial nerve deficit. Deep Tendon Reflexes: Reflexes normal.   Psychiatric:         Mood and Affect: Mood normal.         Behavior: Behavior normal.         Thought Content:  Thought content normal.         Judgment: Judgment normal.          Timmy Sanchez PA-C   [address]

## 2023-10-31 ENCOUNTER — ANESTHESIA EVENT (OUTPATIENT)
Dept: ANESTHESIOLOGY | Facility: AMBULATORY SURGERY CENTER | Age: 23
End: 2023-10-31

## 2023-10-31 ENCOUNTER — ANESTHESIA (OUTPATIENT)
Dept: ANESTHESIOLOGY | Facility: AMBULATORY SURGERY CENTER | Age: 23
End: 2023-10-31

## 2023-11-03 ENCOUNTER — TELEPHONE (OUTPATIENT)
Dept: GASTROENTEROLOGY | Facility: CLINIC | Age: 23
End: 2023-11-03

## 2023-11-03 NOTE — TELEPHONE ENCOUNTER
Procedure confirmed  Endoscopy     Via: MyChart    Instructions given: MyChart     Prep Given: N/A    Call the office if there are any questions.

## 2023-11-06 ENCOUNTER — TELEPHONE (OUTPATIENT)
Age: 23
End: 2023-11-06

## 2023-11-06 NOTE — TELEPHONE ENCOUNTER
Scheduled date of EGD(as of today):01/08/20224  Physician performing EGD:Dr Rubio  [facility] of EGD[facility]   Instructions reviewed with patient by:sent via my chart   Clearances: n/a

## 2023-11-07 ENCOUNTER — APPOINTMENT (OUTPATIENT)
Dept: LAB | Facility: CLINIC | Age: 23
End: 2023-11-07
Payer: COMMERCIAL

## 2023-11-07 ENCOUNTER — OCCMED (OUTPATIENT)
Dept: URGENT CARE | Facility: CLINIC | Age: 23
End: 2023-11-07
Payer: COMMERCIAL

## 2023-11-07 DIAGNOSIS — Z02.1 PRE-EMPLOYMENT EXAMINATION: ICD-10-CM

## 2023-11-07 DIAGNOSIS — Z02.1 PRE-EMPLOYMENT EXAMINATION: Primary | ICD-10-CM

## 2023-11-07 LAB
MEV IGG SER QL IA: ABNORMAL
MUV IGG SER QL IA: NORMAL
RUBV IGG SERPL IA-ACNC: 28.2 IU/ML
VZV IGG SER QL IA: ABNORMAL

## 2023-11-07 PROCEDURE — 86762 RUBELLA ANTIBODY: CPT

## 2023-11-07 PROCEDURE — 36415 COLL VENOUS BLD VENIPUNCTURE: CPT

## 2023-11-07 PROCEDURE — 86480 TB TEST CELL IMMUN MEASURE: CPT

## 2023-11-07 PROCEDURE — 86765 RUBEOLA ANTIBODY: CPT

## 2023-11-07 PROCEDURE — 86735 MUMPS ANTIBODY: CPT

## 2023-11-07 PROCEDURE — 86787 VARICELLA-ZOSTER ANTIBODY: CPT

## 2023-11-10 LAB
GAMMA INTERFERON BACKGROUND BLD IA-ACNC: <0 IU/ML
M TB IFN-G BLD-IMP: NEGATIVE
M TB IFN-G CD4+ BCKGRND COR BLD-ACNC: 0.04 IU/ML
M TB IFN-G CD4+ BCKGRND COR BLD-ACNC: 0.07 IU/ML
MITOGEN IGNF BCKGRD COR BLD-ACNC: 10 IU/ML

## 2023-11-12 ENCOUNTER — OCCMED (OUTPATIENT)
Dept: URGENT CARE | Facility: CLINIC | Age: 23
End: 2023-11-12

## 2023-11-12 DIAGNOSIS — Z00.00 PREVENTATIVE HEALTH CARE: Primary | ICD-10-CM

## 2023-12-11 ENCOUNTER — ANESTHESIA EVENT (OUTPATIENT)
Dept: ANESTHESIOLOGY | Facility: AMBULATORY SURGERY CENTER | Age: 23
End: 2023-12-11

## 2023-12-11 ENCOUNTER — ANESTHESIA (OUTPATIENT)
Dept: ANESTHESIOLOGY | Facility: AMBULATORY SURGERY CENTER | Age: 23
End: 2023-12-11

## 2023-12-27 ENCOUNTER — TELEPHONE (OUTPATIENT)
Dept: GASTROENTEROLOGY | Facility: CLINIC | Age: 23
End: 2023-12-27

## 2023-12-27 NOTE — TELEPHONE ENCOUNTER
Procedure confirmed  Endoscopy     Via: [employer]  delayed sending until 1/2/2024    Instructions given: [employer]     Prep Given: N/A    Call the office if there are any questions.

## 2024-01-05 ENCOUNTER — ANESTHESIA (OUTPATIENT)
Dept: ANESTHESIOLOGY | Facility: AMBULATORY SURGERY CENTER | Age: 24
End: 2024-01-05

## 2024-01-05 ENCOUNTER — ANESTHESIA EVENT (OUTPATIENT)
Dept: ANESTHESIOLOGY | Facility: AMBULATORY SURGERY CENTER | Age: 24
End: 2024-01-05

## 2024-01-08 ENCOUNTER — HOSPITAL ENCOUNTER (OUTPATIENT)
Dept: GASTROENTEROLOGY | Facility: AMBULATORY SURGERY CENTER | Age: 24
Discharge: HOME | End: 2024-01-08
Attending: INTERNAL MEDICINE
Payer: COMMERCIAL

## 2024-01-08 ENCOUNTER — ANESTHESIA (OUTPATIENT)
Dept: GASTROENTEROLOGY | Facility: AMBULATORY SURGERY CENTER | Age: 24
End: 2024-01-08

## 2024-01-08 ENCOUNTER — ANESTHESIA EVENT (OUTPATIENT)
Dept: GASTROENTEROLOGY | Facility: AMBULATORY SURGERY CENTER | Age: 24
End: 2024-01-08

## 2024-01-08 VITALS
TEMPERATURE: 99.6 F | RESPIRATION RATE: 16 BRPM | SYSTOLIC BLOOD PRESSURE: 95 MMHG | DIASTOLIC BLOOD PRESSURE: 59 MMHG | HEART RATE: 64 BPM | HEIGHT: 65 IN | WEIGHT: 185 LBS | BODY MASS INDEX: 30.82 KG/M2 | OXYGEN SATURATION: 99 %

## 2024-01-08 DIAGNOSIS — K90.0 CELIAC DISEASE: ICD-10-CM

## 2024-01-08 LAB
EXT PREGNANCY TEST URINE: NEGATIVE
EXT. CONTROL: NORMAL

## 2024-01-08 PROCEDURE — 43239 EGD BIOPSY SINGLE/MULTIPLE: CPT | Performed by: INTERNAL MEDICINE

## 2024-01-08 PROCEDURE — 88342 IMHCHEM/IMCYTCHM 1ST ANTB: CPT | Performed by: PATHOLOGY

## 2024-01-08 PROCEDURE — 88305 TISSUE EXAM BY PATHOLOGIST: CPT | Performed by: PATHOLOGY

## 2024-01-08 RX ORDER — LIDOCAINE HYDROCHLORIDE 10 MG/ML
INJECTION, SOLUTION EPIDURAL; INFILTRATION; INTRACAUDAL; PERINEURAL AS NEEDED
Status: DISCONTINUED | OUTPATIENT
Start: 2024-01-08 | End: 2024-01-08

## 2024-01-08 RX ORDER — PROPOFOL 10 MG/ML
INJECTION, EMULSION INTRAVENOUS AS NEEDED
Status: DISCONTINUED | OUTPATIENT
Start: 2024-01-08 | End: 2024-01-08

## 2024-01-08 RX ORDER — SODIUM CHLORIDE, SODIUM LACTATE, POTASSIUM CHLORIDE, CALCIUM CHLORIDE 600; 310; 30; 20 MG/100ML; MG/100ML; MG/100ML; MG/100ML
50 INJECTION, SOLUTION INTRAVENOUS CONTINUOUS
Status: DISCONTINUED | OUTPATIENT
Start: 2024-01-08 | End: 2024-01-12 | Stop reason: HOSPADM

## 2024-01-08 RX ADMIN — LIDOCAINE HYDROCHLORIDE 50 MG: 10 INJECTION, SOLUTION EPIDURAL; INFILTRATION; INTRACAUDAL; PERINEURAL at 07:30

## 2024-01-08 RX ADMIN — PROPOFOL 30 MG: 10 INJECTION, EMULSION INTRAVENOUS at 07:35

## 2024-01-08 RX ADMIN — PROPOFOL 30 MG: 10 INJECTION, EMULSION INTRAVENOUS at 07:33

## 2024-01-08 RX ADMIN — SODIUM CHLORIDE, SODIUM LACTATE, POTASSIUM CHLORIDE, CALCIUM CHLORIDE 50 ML/HR: 600; 310; 30; 20 INJECTION, SOLUTION INTRAVENOUS at 07:02

## 2024-01-08 RX ADMIN — PROPOFOL 200 MG: 10 INJECTION, EMULSION INTRAVENOUS at 07:30

## 2024-01-08 NOTE — ANESTHESIA POSTPROCEDURE EVALUATION
Post-Op Assessment Note    CV Status:  Stable  Pain Score: 0    Pain management: adequate       Mental Status:  Sleepy   Hydration Status:  Euvolemic   PONV Controlled:  None   Airway Patency:  Patent     Post Op Vitals Reviewed: Yes      Staff: Anesthesiologist, CRNA   Comments: report given to RN; WINSOME; PRISCILLA              BP   121/66   Temp      Pulse  86   Resp   14   SpO2   100

## 2024-01-08 NOTE — ANESTHESIA PREPROCEDURE EVALUATION
Procedure:  EGD    Relevant Problems   PULMONARY   (+) Exercise-induced asthma        Physical Exam    Airway    Mallampati score: I  TM Distance: >3 FB  Neck ROM: full     Dental   Comment: None loose, No notable dental hx     Cardiovascular      Pulmonary      Other Findings  post-pubertal.      Anesthesia Plan  ASA Score- 2     Anesthesia Type- IV sedation with anesthesia with ASA Monitors.         Additional Monitors:     Airway Plan:     Comment: Npo after MN    Urine hcg = negative .       Plan Factors-Exercise tolerance (METS): >4 METS.    Chart reviewed.    Patient summary reviewed.    Patient is not a current smoker.              Induction- intravenous.    Postoperative Plan-     Informed Consent- Anesthetic plan and risks discussed with patient.  I personally reviewed this patient with the CRNA. Discussed and agreed on the Anesthesia Plan with the CRNA..

## 2024-01-08 NOTE — H&P
History and Physical - [facility]    Candelaria Bangura 23 y.o. female MRN: [id number]      HPI: Candelaria Bangura is a 23 y.o. female who presents for celiac ab +.    Allergies   Allergen Reactions    Gluten Meal - Food Allergy Other (See Comments)     celiac    Oxycodone Hives         REVIEW OF SYSTEMS: Per the HPI, and otherwise unremarkable.    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     Past Surgical History:   Procedure Laterality Date    ABSCESS DRAINAGE Left 10/06/2017    I & D OF LEFT BUTTOCK ABSCESS IN OFFICE-ADNREA HARRELL PA-C    CA ARTHRS KNE SURG W/MENISCECTOMY MED/LAT W/SHVG Right 02/01/2019    Procedure: ARTHROSCOPY KNEE, RIGHT RESECTION OF ANTERIOMEDIAL PLICA;  Surgeon: Jong Sánchez MD;  Location: [facility];  Service: Orthopedics    CA EXC TUMOR SOFT TISS NECK/THORAX SUBFASCIAL <5CM N/A 08/22/2023    Procedure: EXCISION OF SUBCUTANEOUS MASS/LIPOMA OF CHEST/UPPER ABDOMEN;  Surgeon: Jim Mccain MD;  Location: [facility];  Service: Plastics     Social History   Social History     Substance and Sexual Activity   Alcohol Use Yes    Comment: rare     Social History     Substance and Sexual Activity   Drug Use Never     Social History     Tobacco Use   Smoking Status Never   Smokeless Tobacco Never     Family History   Problem Relation Age of Onset    Asthma Mother     Hyperlipidemia Mother     BRUNO disease Mother     Heart disease Father     Hyperlipidemia Father     BRUNO disease Father     Stroke Maternal Grandmother     Hypertension Maternal Grandmother     Breast cancer Maternal Grandmother     Diabetes Maternal Grandmother     Heart disease Maternal Grandmother     Hyperlipidemia Maternal Grandmother     Heart disease Maternal Grandfather     Hyperlipidemia Maternal Grandfather     Heart  "disease Paternal Grandmother     Heart disease Paternal Grandfather     Drug abuse Neg Hx     Mental illness Neg Hx     Colon cancer Neg Hx     Colon polyps Neg Hx     Celiac disease Neg Hx     Inflammatory bowel disease Neg Hx        Meds/Allergies       Current Outpatient Medications:     albuterol (PROVENTIL HFA,VENTOLIN HFA) 90 mcg/act inhaler    Progesterone 100 MG CAPS    spironolactone (ALDACTONE) 50 mg tablet    Current Facility-Administered Medications:     lactated ringers infusion, 50 mL/hr, Intravenous, Continuous, 50 mL/hr at 01/08/24 0702        Objective     /69   Pulse 65   Temp 99.6 °F (37.6 °C) (Temporal)   Resp 15   Ht 5' 5\" (1.651 m)   Wt 83.9 kg (185 lb)   SpO2 98%   BMI 30.79 kg/m²       PHYSICAL EXAM    Gen: NAD AAOx3  Head: Normocephalic, Atraumatic  CV: S1S2 RRR no m/r/g  CHEST: Clear b/l no c/r/w  ABD: soft, +BS NT/ND no masses  EXT: no edema      ASSESSMENT/PLAN:  This is a 23 y.o. year old female here for EGD, and she is stable and optimized for her procedure.        "

## 2024-01-11 PROCEDURE — 88305 TISSUE EXAM BY PATHOLOGIST: CPT | Performed by: PATHOLOGY

## 2024-01-11 PROCEDURE — 88342 IMHCHEM/IMCYTCHM 1ST ANTB: CPT | Performed by: PATHOLOGY

## 2024-10-18 ENCOUNTER — TELEPHONE (OUTPATIENT)
Age: 24
End: 2024-10-18

## 2024-10-18 NOTE — TELEPHONE ENCOUNTER
Pt has an appt with Dr Flores on 12/3/24 but is established with Dr Cole. I called and left a VM to contact the office to verify transfer of care

## 2024-12-03 ENCOUNTER — OFFICE VISIT (OUTPATIENT)
Dept: GASTROENTEROLOGY | Facility: CLINIC | Age: 24
End: 2024-12-03
Payer: COMMERCIAL

## 2024-12-03 VITALS
BODY MASS INDEX: 32.52 KG/M2 | WEIGHT: 195.4 LBS | SYSTOLIC BLOOD PRESSURE: 122 MMHG | DIASTOLIC BLOOD PRESSURE: 64 MMHG | TEMPERATURE: 98.1 F

## 2024-12-03 DIAGNOSIS — R14.0 BLOATING: ICD-10-CM

## 2024-12-03 DIAGNOSIS — K90.0 CELIAC DISEASE: Primary | ICD-10-CM

## 2024-12-03 DIAGNOSIS — R19.4 CHANGE IN BOWEL HABITS: ICD-10-CM

## 2024-12-03 PROCEDURE — 99214 OFFICE O/P EST MOD 30 MIN: CPT | Performed by: INTERNAL MEDICINE

## 2024-12-03 NOTE — PROGRESS NOTES
Name: Candelaria Bangura      : 2000      MRN: [id number]  Encounter Provider: Suzie lFores DO  Encounter Date: 12/3/2024   Encounter department: [facility] [place]  :  Assessment & Plan  Celiac disease  Reviewed outpatient blood work with very high TTG IgA and Donavan antibodies with follow-up biopsies from 2024 demonstrating histology consistent with celiac disease.  She suspects that she has gluten exposure possibly once per week.  We discussed complete avoidance and if not possible to consider working with dietitian to help guide this.  In addition we will check repeat celiac antibody profile now to see if there is been any improvement since prior.  Will also check for vitamin deficiencies as detailed below.  Discussed future need for DEXA scan and recommendation for pneumococcal vaccination.  She will eventually need follow-up endoscopy once it is felt that she is in remission with normal antibodies to ensure that she is in histologic remission as well.  Orders:    Ambulatory Referral to Nutrition Services; Future    Celiac Disease Panel; Future    Iron Panel (Includes Ferritin, Iron Sat%, Iron, and TIBC); Future    Folate; Future    Vitamin B12; Future    Vitamin D 25 hydroxy; Future    Vitamin A; Future    Vitamin K; Future    Bloating  Secondary to celiac disease.  We did discuss overlap of other conditions such as SIBO or microscopic colitis contributing to her symptoms however would determine this after she has had complete avoidance of gluten and normal antibodies to determine if further workup is indicated  Orders:    Celiac Disease Panel; Future    Change in bowel habits  Fluctuations stools likely secondary to ongoing celiac disease.  Plan for complete cessation of gluten consumption as above.  If she continues to have loose stools despite negative antibodies/normal biopsies on EGD, could consider colonoscopy to rule out microscopic colitis or other  differentials.     Follow up in 6 months      History of Present Illness     HPI  Candelaria Bangura is a 24 y.o. female who presents for follow-up regarding history of celiac disease.  She had outpatient labs through a holistic provider which demonstrated severe elevations in celiac antibodies.  She had an EGD in January 2024 by Dr. Cole with duodenal biopsies consistent with celiac [place] classification.    Since then she has done well to avoid gluten but unfortunately as she is in a household where other people are not gluten-free, feels she probably has exposure once per week.  She continues to have intermittent bloating and fluctuation in bowel habits.    To her knowledge there is no family history of celiac disease in any of her 3 relatives.      Review of Systems       Objective   /64 (BP Location: Right arm, Patient Position: Sitting, Cuff Size: Standard)   Temp 98.1 °F (36.7 °C) (Tympanic)   Wt 88.6 kg (195 lb 6.4 oz)   BMI 32.52 kg/m²      Physical Exam

## 2024-12-03 NOTE — ASSESSMENT & PLAN NOTE
Reviewed outpatient blood work with very high TTG IgA and Donavan antibodies with follow-up biopsies from January 2024 demonstrating histology consistent with celiac disease.  She suspects that she has gluten exposure possibly once per week.  We discussed complete avoidance and if not possible to consider working with dietitian to help guide this.  In addition we will check repeat celiac antibody profile now to see if there is been any improvement since prior.  Will also check for vitamin deficiencies as detailed below.  Discussed future need for DEXA scan and recommendation for pneumococcal vaccination.  She will eventually need follow-up endoscopy once it is felt that she is in remission with normal antibodies to ensure that she is in histologic remission as well.  Orders:    Ambulatory Referral to Nutrition Services; Future    Celiac Disease Panel; Future    Iron Panel (Includes Ferritin, Iron Sat%, Iron, and TIBC); Future    Folate; Future    Vitamin B12; Future    Vitamin D 25 hydroxy; Future    Vitamin A; Future    Vitamin K; Future

## 2024-12-05 ENCOUNTER — APPOINTMENT (OUTPATIENT)
Dept: LAB | Facility: HOSPITAL | Age: 24
End: 2024-12-05
Payer: COMMERCIAL

## 2024-12-05 DIAGNOSIS — R14.0 BLOATING: ICD-10-CM

## 2024-12-05 DIAGNOSIS — K90.0 CELIAC DISEASE: ICD-10-CM

## 2024-12-05 LAB
25(OH)D3 SERPL-MCNC: 34.2 NG/ML (ref 30–100)
FERRITIN SERPL-MCNC: 9 NG/ML (ref 11–307)
FOLATE SERPL-MCNC: 5.3 NG/ML
VIT B12 SERPL-MCNC: 169 PG/ML (ref 180–914)

## 2024-12-05 PROCEDURE — 84590 ASSAY OF VITAMIN A: CPT

## 2024-12-05 PROCEDURE — 36415 COLL VENOUS BLD VENIPUNCTURE: CPT

## 2024-12-05 PROCEDURE — 83550 IRON BINDING TEST: CPT

## 2024-12-05 PROCEDURE — 82728 ASSAY OF FERRITIN: CPT

## 2024-12-05 PROCEDURE — 86364 TISS TRNSGLTMNASE EA IG CLAS: CPT

## 2024-12-05 PROCEDURE — 82746 ASSAY OF FOLIC ACID SERUM: CPT

## 2024-12-05 PROCEDURE — 82607 VITAMIN B-12: CPT

## 2024-12-05 PROCEDURE — 82306 VITAMIN D 25 HYDROXY: CPT

## 2024-12-05 PROCEDURE — 82784 ASSAY IGA/IGD/IGG/IGM EACH: CPT

## 2024-12-05 PROCEDURE — 84597 ASSAY OF VITAMIN K: CPT

## 2024-12-05 PROCEDURE — 86258 DGP ANTIBODY EACH IG CLASS: CPT

## 2024-12-05 PROCEDURE — 83540 ASSAY OF IRON: CPT

## 2024-12-06 LAB
GLIADIN PEPTIDE IGA SER-ACNC: 10.9 U/ML
GLIADIN PEPTIDE IGA SER-ACNC: NEGATIVE
GLIADIN PEPTIDE IGG SER-ACNC: 11.8 U/ML
GLIADIN PEPTIDE IGG SER-ACNC: NEGATIVE
IGA SERPL-MCNC: 132 MG/DL (ref 66–433)
IRON SATN MFR SERPL: 32 % (ref 15–50)
IRON SERPL-MCNC: 125 UG/DL (ref 50–212)
TIBC SERPL-MCNC: 395 UG/DL (ref 250–450)
TTG IGA SER-ACNC: 33.2 U/ML
TTG IGA SER-ACNC: POSITIVE
TTG IGG SER-ACNC: <0.8 U/ML
TTG IGG SER-ACNC: NEGATIVE
UIBC SERPL-MCNC: 270 UG/DL (ref 155–355)

## 2024-12-09 LAB — PHYTONADIONE SERPL-MCNC: 0.4 NG/ML (ref 0.1–2.2)

## 2024-12-10 ENCOUNTER — RESULTS FOLLOW-UP (OUTPATIENT)
Dept: GASTROENTEROLOGY | Facility: CLINIC | Age: 24
End: 2024-12-10

## 2024-12-10 DIAGNOSIS — E61.1 IRON DEFICIENCY: Primary | ICD-10-CM

## 2024-12-10 DIAGNOSIS — E53.8 B12 DEFICIENCY: ICD-10-CM

## 2024-12-10 DIAGNOSIS — K90.0 CELIAC DISEASE: ICD-10-CM

## 2024-12-10 RX ORDER — FOLIC ACID 1 MG/1
1 TABLET ORAL DAILY
Qty: 30 TABLET | Refills: 3 | Status: SHIPPED | OUTPATIENT
Start: 2024-12-10

## 2024-12-10 RX ORDER — FERROUS SULFATE 324(65)MG
324 TABLET, DELAYED RELEASE (ENTERIC COATED) ORAL DAILY
Qty: 30 TABLET | Refills: 3 | Status: SHIPPED | OUTPATIENT
Start: 2024-12-10

## 2024-12-11 LAB — VIT A SERPL-MCNC: 39.4 UG/DL (ref 18.9–57.3)

## 2025-04-11 DIAGNOSIS — K90.0 CELIAC DISEASE: Primary | ICD-10-CM

## 2025-05-05 DIAGNOSIS — K90.0 CELIAC DISEASE: Primary | ICD-10-CM

## 2025-05-05 DIAGNOSIS — R63.5 WEIGHT GAIN: ICD-10-CM

## 2025-06-16 ENCOUNTER — TELEPHONE (OUTPATIENT)
Dept: GASTROENTEROLOGY | Facility: CLINIC | Age: 25
End: 2025-06-16

## 2025-06-16 NOTE — TELEPHONE ENCOUNTER
I called and left a message for the patient to call back to reschedule office visit on 7/9/25 with Dr Flores due to a change in her schedule. The patient can be rescheduled for Dr Flores's next available [place] appointment and placed on a wait list or with a PA. If the patient can do a virtual appointment she can be scheduled on a day that Oli Flores is at [place].

## 2025-06-17 NOTE — TELEPHONE ENCOUNTER
I called and left a message for the patient stating that at this time we have canceled her appt for 7/9/25 and advised that she call back to schedule either with Dr Flores or a PA on another date. Sent a letter via myc

## 2025-07-08 ENCOUNTER — APPOINTMENT (OUTPATIENT)
Dept: LAB | Facility: HOSPITAL | Age: 25
End: 2025-07-08
Payer: COMMERCIAL

## 2025-07-08 DIAGNOSIS — R63.5 WEIGHT GAIN: ICD-10-CM

## 2025-07-08 DIAGNOSIS — E61.1 IRON DEFICIENCY: ICD-10-CM

## 2025-07-08 DIAGNOSIS — K90.0 CELIAC DISEASE: ICD-10-CM

## 2025-07-08 LAB
25(OH)D3 SERPL-MCNC: 54.1 NG/ML (ref 30–100)
ALBUMIN SERPL BCG-MCNC: 4 G/DL (ref 3.5–5)
ALP SERPL-CCNC: 52 U/L (ref 34–104)
ALT SERPL W P-5'-P-CCNC: 16 U/L (ref 7–52)
ANION GAP SERPL CALCULATED.3IONS-SCNC: 6 MMOL/L (ref 4–13)
AST SERPL W P-5'-P-CCNC: 15 U/L (ref 13–39)
BASOPHILS # BLD AUTO: 0.04 THOUSANDS/ÂΜL (ref 0–0.1)
BASOPHILS NFR BLD AUTO: 1 % (ref 0–1)
BILIRUB SERPL-MCNC: 0.63 MG/DL (ref 0.2–1)
BUN SERPL-MCNC: 8 MG/DL (ref 5–25)
CALCIUM SERPL-MCNC: 9.3 MG/DL (ref 8.4–10.2)
CHLORIDE SERPL-SCNC: 105 MMOL/L (ref 96–108)
CO2 SERPL-SCNC: 29 MMOL/L (ref 21–32)
CREAT SERPL-MCNC: 0.79 MG/DL (ref 0.6–1.3)
EOSINOPHIL # BLD AUTO: 0.13 THOUSAND/ÂΜL (ref 0–0.61)
EOSINOPHIL NFR BLD AUTO: 2 % (ref 0–6)
ERYTHROCYTE [DISTWIDTH] IN BLOOD BY AUTOMATED COUNT: 12.8 % (ref 11.6–15.1)
FERRITIN SERPL-MCNC: 27 NG/ML (ref 30–307)
FOLATE SERPL-MCNC: 12.2 NG/ML
GFR SERPL CREATININE-BSD FRML MDRD: 105 ML/MIN/1.73SQ M
GLUCOSE SERPL-MCNC: 86 MG/DL (ref 65–140)
HCT VFR BLD AUTO: 42.4 % (ref 34.8–46.1)
HGB BLD-MCNC: 14.1 G/DL (ref 11.5–15.4)
IGA SERPL-MCNC: 130 MG/DL (ref 66–433)
IMM GRANULOCYTES # BLD AUTO: 0.01 THOUSAND/UL (ref 0–0.2)
IMM GRANULOCYTES NFR BLD AUTO: 0 % (ref 0–2)
IRON SATN MFR SERPL: 47 % (ref 15–50)
IRON SERPL-MCNC: 149 UG/DL (ref 50–212)
LYMPHOCYTES # BLD AUTO: 1.82 THOUSANDS/ÂΜL (ref 0.6–4.47)
LYMPHOCYTES NFR BLD AUTO: 27 % (ref 14–44)
MCH RBC QN AUTO: 30.3 PG (ref 26.8–34.3)
MCHC RBC AUTO-ENTMCNC: 33.3 G/DL (ref 31.4–37.4)
MCV RBC AUTO: 91 FL (ref 82–98)
MONOCYTES # BLD AUTO: 0.65 THOUSAND/ÂΜL (ref 0.17–1.22)
MONOCYTES NFR BLD AUTO: 10 % (ref 4–12)
NEUTROPHILS # BLD AUTO: 4.1 THOUSANDS/ÂΜL (ref 1.85–7.62)
NEUTS SEG NFR BLD AUTO: 60 % (ref 43–75)
NRBC BLD AUTO-RTO: 0 /100 WBCS
PLATELET # BLD AUTO: 360 THOUSANDS/UL (ref 149–390)
PMV BLD AUTO: 9.8 FL (ref 8.9–12.7)
POTASSIUM SERPL-SCNC: 3.9 MMOL/L (ref 3.5–5.3)
PROT SERPL-MCNC: 6.7 G/DL (ref 6.4–8.4)
RBC # BLD AUTO: 4.66 MILLION/UL (ref 3.81–5.12)
SODIUM SERPL-SCNC: 140 MMOL/L (ref 135–147)
T4 FREE SERPL-MCNC: 0.76 NG/DL (ref 0.61–1.12)
TIBC SERPL-MCNC: 315 UG/DL (ref 250–450)
TRANSFERRIN SERPL-MCNC: 225 MG/DL (ref 203–362)
TSH SERPL DL<=0.05 MIU/L-ACNC: 4.71 UIU/ML (ref 0.45–4.5)
UIBC SERPL-MCNC: 166 UG/DL (ref 155–355)
VIT B12 SERPL-MCNC: 423 PG/ML (ref 180–914)
WBC # BLD AUTO: 6.75 THOUSAND/UL (ref 4.31–10.16)

## 2025-07-08 PROCEDURE — 82728 ASSAY OF FERRITIN: CPT

## 2025-07-08 PROCEDURE — 36415 COLL VENOUS BLD VENIPUNCTURE: CPT

## 2025-07-08 PROCEDURE — 82607 VITAMIN B-12: CPT

## 2025-07-08 PROCEDURE — 83550 IRON BINDING TEST: CPT

## 2025-07-08 PROCEDURE — 86364 TISS TRNSGLTMNASE EA IG CLAS: CPT

## 2025-07-08 PROCEDURE — 83540 ASSAY OF IRON: CPT

## 2025-07-08 PROCEDURE — 84443 ASSAY THYROID STIM HORMONE: CPT

## 2025-07-08 PROCEDURE — 82746 ASSAY OF FOLIC ACID SERUM: CPT

## 2025-07-08 PROCEDURE — 80053 COMPREHEN METABOLIC PANEL: CPT

## 2025-07-08 PROCEDURE — 84439 ASSAY OF FREE THYROXINE: CPT

## 2025-07-08 PROCEDURE — 82306 VITAMIN D 25 HYDROXY: CPT

## 2025-07-08 PROCEDURE — 82784 ASSAY IGA/IGD/IGG/IGM EACH: CPT

## 2025-07-08 PROCEDURE — 85025 COMPLETE CBC W/AUTO DIFF WBC: CPT

## 2025-07-09 ENCOUNTER — OFFICE VISIT (OUTPATIENT)
Dept: FAMILY MEDICINE CLINIC | Facility: CLINIC | Age: 25
End: 2025-07-09
Payer: COMMERCIAL

## 2025-07-09 VITALS
TEMPERATURE: 98 F | SYSTOLIC BLOOD PRESSURE: 130 MMHG | HEIGHT: 65 IN | OXYGEN SATURATION: 98 % | BODY MASS INDEX: 31.99 KG/M2 | WEIGHT: 192 LBS | RESPIRATION RATE: 16 BRPM | HEART RATE: 71 BPM | DIASTOLIC BLOOD PRESSURE: 82 MMHG

## 2025-07-09 DIAGNOSIS — R79.89 ABNORMAL THYROID BLOOD TEST: Primary | ICD-10-CM

## 2025-07-09 PROCEDURE — 99213 OFFICE O/P EST LOW 20 MIN: CPT | Performed by: PHYSICIAN ASSISTANT

## 2025-07-09 NOTE — PROGRESS NOTES
"Name: Candelaria Bangura      : 2000      MRN: [id number]  Encounter Provider: Mariya Robert PA-C  Encounter Date: 2025   Encounter department: [facility]  :  Assessment & Plan  Abnormal thyroid blood test    Tsh 4.3, will recheck this in a month to monitor for any trends, discussed at length.   Orders:    TSH, 3rd generation; Future    T4, free; Future    Anti-microsomal antibody; Future    Thyroid stimulating immunoglobulin; Future    F/u as needed       History of Present Illness   Patient following with GI for celiac, TSH abnormal and advised to follow up here. Notes fatigue, dry skin, brittle hair. Wondering if thyroid or celiac.      Review of Systems    Objective   /82   Pulse 71   Temp 98 °F (36.7 °C) (Temporal)   Resp 16   Ht 5' 5\" (1.651 m)   Wt 87.1 kg (192 lb)   SpO2 98%   BMI 31.95 kg/m²      Physical Exam  Constitutional:       Appearance: Normal appearance. She is well-developed and normal weight.   HENT:      Head: Normocephalic and atraumatic.     Neurological:      General: No focal deficit present.      Mental Status: She is alert and oriented to person, place, and time.     Psychiatric:         Mood and Affect: Mood normal.         Behavior: Behavior normal.         Thought Content: Thought content normal.         Judgment: Judgment normal.         "

## 2025-07-17 LAB — TTG IGA SER IA-ACNC: 5.7 U/ML (ref ?–10)

## 2025-07-30 DIAGNOSIS — M79.671 RIGHT FOOT PAIN: Primary | ICD-10-CM

## 2025-08-13 ENCOUNTER — HOSPITAL ENCOUNTER (OUTPATIENT)
Dept: RADIOLOGY | Facility: HOSPITAL | Age: 25
Discharge: HOME | End: 2025-08-13
Payer: COMMERCIAL

## 2025-08-13 DIAGNOSIS — M79.671 RIGHT FOOT PAIN: ICD-10-CM

## 2025-08-13 PROCEDURE — 73630 X-RAY EXAM OF FOOT: CPT

## (undated) DEVICE — CHLORAPREP HI-LITE 26ML ORANGE

## (undated) DEVICE — GLOVE INDICATOR PI UNDERGLOVE SZ 7 BLUE

## (undated) DEVICE — DECANTER: Brand: UNBRANDED

## (undated) DEVICE — SYRINGE 3ML LL

## (undated) DEVICE — TIBURON SPLIT SHEET: Brand: CONVERTORS

## (undated) DEVICE — FILTER STRAW 1.7

## (undated) DEVICE — SPONGE STICK WITH PVP-I: Brand: KENDALL

## (undated) DEVICE — NEEDLE 27 G X 1 1/4

## (undated) DEVICE — 3000CC GUARDIAN II: Brand: GUARDIAN

## (undated) DEVICE — BLADE SHAVER EXCALIBUR 4MM 13CM COOLCUT

## (undated) DEVICE — POV-IOD SWAB STICKS

## (undated) DEVICE — GLOVE SRG BIOGEL ORTHOPEDIC 7.5

## (undated) DEVICE — NEEDLE 18 G X 1 1/2

## (undated) DEVICE — ACE WRAP 6 IN UNSTERILE

## (undated) DEVICE — 3M™ STERI-STRIP™ COMPOUND BENZOIN TINCTURE 40 BAGS/CARTON 4 CARTONS/CASE C1544: Brand: 3M™ STERI-STRIP™

## (undated) DEVICE — VIAL DECANTER

## (undated) DEVICE — SYRINGE 30ML LL

## (undated) DEVICE — TUBING SUCTION 5MM X 12 FT

## (undated) DEVICE — GLOVE SRG BIOGEL 7

## (undated) DEVICE — CAST PADDING 6 IN STERILE

## (undated) DEVICE — IMPERVIOUS STOCKINETTE: Brand: DEROYAL

## (undated) DEVICE — 3M™ STERI-STRIP™ REINFORCED ADHESIVE SKIN CLOSURES, R1547, 1/2 IN X 4 IN (12 MM X 100 MM), 6 STRIPS/ENVELOPE: Brand: 3M™ STERI-STRIP™

## (undated) DEVICE — GAUZE SPONGES,16 PLY: Brand: CURITY

## (undated) DEVICE — INTENDED FOR TISSUE SEPARATION, AND OTHER PROCEDURES THAT REQUIRE A SHARP SURGICAL BLADE TO PUNCTURE OR CUT.: Brand: BARD-PARKER ® CARBON RIB-BACK BLADES

## (undated) DEVICE — BETHLEHEM UNIVERSAL  ARTHRO PK: Brand: CARDINAL HEALTH

## (undated) DEVICE — 2000CC GUARDIAN II: Brand: GUARDIAN

## (undated) DEVICE — GLOVE SRG BIOGEL 7.5

## (undated) DEVICE — OCCLUSIVE GAUZE STRIP,3% BISMUTH TRIBROMOPHENATE IN PETROLATUM BLEND: Brand: XEROFORM

## (undated) DEVICE — GLOVE INDICATOR PI UNDERGLOVE SZ 7.5 BLUE

## (undated) DEVICE — TUBING ARTHROSCOPIC WAVE  MAIN PUMP

## (undated) DEVICE — BETHLEHEM UNIVERSAL OUTPATIENT: Brand: CARDINAL HEALTH

## (undated) DEVICE — SUT ETHILON 3-0 PS-1 18 IN 1663H

## (undated) DEVICE — ELECTRODE NEEDLE MEGAFINE 2IN E-Z CLEAN MEGADYNE -0118

## (undated) DEVICE — NEEDLE HYPO 22G X 1-1/2 IN

## (undated) DEVICE — 3M™ TEGADERM™ TRANSPARENT FILM DRESSING FRAME STYLE, 1626W, 4 IN X 4-3/4 IN (10 CM X 12 CM), 50/CT 4CT/CASE: Brand: 3M™ TEGADERM™

## (undated) DEVICE — INTENDED FOR TISSUE SEPARATION, AND OTHER PROCEDURES THAT REQUIRE A SHARP SURGICAL BLADE TO PUNCTURE OR CUT.: Brand: BARD-PARKER SAFETY BLADES SIZE 11, STERILE

## (undated) DEVICE — GLOVE INDICATOR PI UNDERGLOVE SZ 8 BLUE

## (undated) DEVICE — PENCIL ELECTROSURG E-Z CLEAN -0035H